# Patient Record
Sex: MALE | Race: WHITE | Employment: OTHER | URBAN - METROPOLITAN AREA
[De-identification: names, ages, dates, MRNs, and addresses within clinical notes are randomized per-mention and may not be internally consistent; named-entity substitution may affect disease eponyms.]

---

## 2018-11-11 ENCOUNTER — APPOINTMENT (OUTPATIENT)
Dept: CT IMAGING | Facility: HOSPITAL | Age: 73
DRG: 176 | End: 2018-11-11
Attending: EMERGENCY MEDICINE
Payer: MEDICAID

## 2018-11-11 ENCOUNTER — APPOINTMENT (OUTPATIENT)
Dept: ULTRASOUND IMAGING | Facility: HOSPITAL | Age: 73
DRG: 176 | End: 2018-11-11
Attending: HOSPITALIST
Payer: MEDICAID

## 2018-11-11 ENCOUNTER — APPOINTMENT (OUTPATIENT)
Dept: GENERAL RADIOLOGY | Facility: HOSPITAL | Age: 73
DRG: 176 | End: 2018-11-11
Attending: EMERGENCY MEDICINE
Payer: MEDICAID

## 2018-11-11 ENCOUNTER — HOSPITAL ENCOUNTER (INPATIENT)
Facility: HOSPITAL | Age: 73
LOS: 1 days | Discharge: HOME | DRG: 176 | End: 2018-11-12
Attending: EMERGENCY MEDICINE | Admitting: HOSPITALIST
Payer: MEDICAID

## 2018-11-11 DIAGNOSIS — I26.99 OTHER ACUTE PULMONARY EMBOLISM WITHOUT ACUTE COR PULMONALE (HCC): Primary | ICD-10-CM

## 2018-11-11 PROCEDURE — 93970 EXTREMITY STUDY: CPT | Performed by: HOSPITALIST

## 2018-11-11 PROCEDURE — 71046 X-RAY EXAM CHEST 2 VIEWS: CPT | Performed by: EMERGENCY MEDICINE

## 2018-11-11 PROCEDURE — 83036 HEMOGLOBIN GLYCOSYLATED A1C: CPT | Performed by: HOSPITALIST

## 2018-11-11 PROCEDURE — 85730 THROMBOPLASTIN TIME PARTIAL: CPT | Performed by: EMERGENCY MEDICINE

## 2018-11-11 PROCEDURE — 71260 CT THORAX DX C+: CPT | Performed by: EMERGENCY MEDICINE

## 2018-11-11 PROCEDURE — 96374 THER/PROPH/DIAG INJ IV PUSH: CPT

## 2018-11-11 PROCEDURE — 85025 COMPLETE CBC W/AUTO DIFF WBC: CPT | Performed by: EMERGENCY MEDICINE

## 2018-11-11 PROCEDURE — 80048 BASIC METABOLIC PNL TOTAL CA: CPT

## 2018-11-11 PROCEDURE — 81001 URINALYSIS AUTO W/SCOPE: CPT | Performed by: EMERGENCY MEDICINE

## 2018-11-11 PROCEDURE — 85379 FIBRIN DEGRADATION QUANT: CPT | Performed by: EMERGENCY MEDICINE

## 2018-11-11 PROCEDURE — 85025 COMPLETE CBC W/AUTO DIFF WBC: CPT

## 2018-11-11 PROCEDURE — 85730 THROMBOPLASTIN TIME PARTIAL: CPT | Performed by: HOSPITALIST

## 2018-11-11 PROCEDURE — 99285 EMERGENCY DEPT VISIT HI MDM: CPT

## 2018-11-11 RX ORDER — POLYETHYLENE GLYCOL 3350 17 G/17G
17 POWDER, FOR SOLUTION ORAL DAILY PRN
Status: DISCONTINUED | OUTPATIENT
Start: 2018-11-11 | End: 2018-11-12

## 2018-11-11 RX ORDER — LATANOPROST 50 UG/ML
1 SOLUTION/ DROPS OPHTHALMIC NIGHTLY
Status: DISCONTINUED | OUTPATIENT
Start: 2018-11-11 | End: 2018-11-12

## 2018-11-11 RX ORDER — DORZOLAMIDE HYDROCHLORIDE AND TIMOLOL MALEATE 20; 5 MG/ML; MG/ML
1 SOLUTION/ DROPS OPHTHALMIC 2 TIMES DAILY
Status: DISCONTINUED | OUTPATIENT
Start: 2018-11-11 | End: 2018-11-12

## 2018-11-11 RX ORDER — DORZOLAMIDE HYDROCHLORIDE AND TIMOLOL MALEATE 20; 5 MG/ML; MG/ML
1 SOLUTION/ DROPS OPHTHALMIC 2 TIMES DAILY
Status: ON HOLD | COMMUNITY
End: 2018-11-19

## 2018-11-11 RX ORDER — METOCLOPRAMIDE HYDROCHLORIDE 5 MG/ML
10 INJECTION INTRAMUSCULAR; INTRAVENOUS EVERY 8 HOURS PRN
Status: DISCONTINUED | OUTPATIENT
Start: 2018-11-11 | End: 2018-11-12

## 2018-11-11 RX ORDER — HEPARIN SODIUM AND DEXTROSE 10000; 5 [USP'U]/100ML; G/100ML
INJECTION INTRAVENOUS CONTINUOUS
Status: DISCONTINUED | OUTPATIENT
Start: 2018-11-11 | End: 2018-11-12

## 2018-11-11 RX ORDER — LISINOPRIL AND HYDROCHLOROTHIAZIDE 12.5; 1 MG/1; MG/1
1 TABLET ORAL DAILY
COMMUNITY

## 2018-11-11 RX ORDER — HEPARIN SODIUM AND DEXTROSE 10000; 5 [USP'U]/100ML; G/100ML
18 INJECTION INTRAVENOUS ONCE
Status: COMPLETED | OUTPATIENT
Start: 2018-11-11 | End: 2018-11-11

## 2018-11-11 RX ORDER — BISACODYL 10 MG
10 SUPPOSITORY, RECTAL RECTAL
Status: DISCONTINUED | OUTPATIENT
Start: 2018-11-11 | End: 2018-11-12

## 2018-11-11 RX ORDER — LATANOPROST 50 UG/ML
1 SOLUTION/ DROPS OPHTHALMIC NIGHTLY
COMMUNITY

## 2018-11-11 RX ORDER — LISINOPRIL AND HYDROCHLOROTHIAZIDE 12.5; 1 MG/1; MG/1
1 TABLET ORAL DAILY
Status: DISCONTINUED | OUTPATIENT
Start: 2018-11-12 | End: 2018-11-11

## 2018-11-11 RX ORDER — ONDANSETRON 2 MG/ML
4 INJECTION INTRAMUSCULAR; INTRAVENOUS EVERY 6 HOURS PRN
Status: DISCONTINUED | OUTPATIENT
Start: 2018-11-11 | End: 2018-11-12

## 2018-11-11 RX ORDER — HEPARIN SODIUM 1000 [USP'U]/ML
80 INJECTION, SOLUTION INTRAVENOUS; SUBCUTANEOUS ONCE
Status: COMPLETED | OUTPATIENT
Start: 2018-11-11 | End: 2018-11-11

## 2018-11-11 RX ORDER — ACETAMINOPHEN 325 MG/1
650 TABLET ORAL EVERY 6 HOURS PRN
Status: DISCONTINUED | OUTPATIENT
Start: 2018-11-11 | End: 2018-11-12

## 2018-11-11 RX ORDER — LISINOPRIL 10 MG/1
10 TABLET ORAL DAILY
Status: ON HOLD | COMMUNITY
End: 2018-11-11

## 2018-11-11 RX ORDER — SODIUM CHLORIDE 0.9 % (FLUSH) 0.9 %
3 SYRINGE (ML) INJECTION AS NEEDED
Status: DISCONTINUED | OUTPATIENT
Start: 2018-11-11 | End: 2018-11-12

## 2018-11-11 NOTE — H&P
[facility] Hospitalist H&P     CC: Patient presents with:  Fever (infectious)  Abdomen/Flank Pain (GI/)     PCP: Jessa Pereira    Date of Admission: 11/11/2018  9:02 AM    ASSESSMENT / PLAN:     Mr. Sheela Lindsey is a 75 yo M with PMH of HTN who presented with chest Giancarlo Granado  History reviewed. No pertinent surgical history. ALL:  No Known Allergies     Home Medications:    Outpatient Medications Marked as [facility]  lisinopril 10 MG Oral Tab Take 10 mg by mouth daily.  D 11/11/2018  CONCLUSION:   Bibasilar airspace disease with suspected trace right effusion. Findings could relate to atelectasis, aspiration, or pneumonia. Radiographic followup to resolution advised in 6-8 weeks. Borderline cardiomegaly.   Dictated by (CST)

## 2018-11-11 NOTE — ED INITIAL ASSESSMENT (HPI)
Per patient \"right upper quad pain started on Thursday, sharp pain with fever of 37.7 that start this morning\" Denies diarrhea, vomiting/nausea. Last BM Today.

## 2018-11-11 NOTE — ED PROVIDER NOTES
Patient Seen in: [facility] AND [facility] Emergency Department    History   Patient presents with:  Fever (infectious)  Abdomen/Flank Pain (GI/)    Stated Complaint:     HPI    Patient is a 68-year-old male who presents to the emergency department with a main wheezes. He has rhonchi in the right lower field. He has no rales. Abdominal: Soft. Bowel sounds are normal. There is no hepatosplenomegaly or hepatomegaly. There is no tenderness. No hernia. Musculoskeletal: Normal range of motion.    Neurological: He [facility])  (primary encounter diagnosis)    Disposition:  Admit  11/11/2018  2:10 pm    Follow-up:  No follow-up provider specified.   We recommend that you schedule follow up care with a primary care provider within the next three months to obtain basic health

## 2018-11-12 ENCOUNTER — APPOINTMENT (OUTPATIENT)
Dept: CV DIAGNOSTICS | Facility: HOSPITAL | Age: 73
DRG: 176 | End: 2018-11-12
Attending: HOSPITALIST
Payer: MEDICAID

## 2018-11-12 VITALS
SYSTOLIC BLOOD PRESSURE: 148 MMHG | HEART RATE: 58 BPM | TEMPERATURE: 99 F | WEIGHT: 169.88 LBS | BODY MASS INDEX: 28.3 KG/M2 | RESPIRATION RATE: 18 BRPM | DIASTOLIC BLOOD PRESSURE: 88 MMHG | OXYGEN SATURATION: 94 % | HEIGHT: 65 IN

## 2018-11-12 PROCEDURE — 80048 BASIC METABOLIC PNL TOTAL CA: CPT | Performed by: HOSPITALIST

## 2018-11-12 PROCEDURE — 85610 PROTHROMBIN TIME: CPT | Performed by: HOSPITALIST

## 2018-11-12 PROCEDURE — 93306 TTE W/DOPPLER COMPLETE: CPT | Performed by: HOSPITALIST

## 2018-11-12 PROCEDURE — 85730 THROMBOPLASTIN TIME PARTIAL: CPT | Performed by: HOSPITALIST

## 2018-11-12 PROCEDURE — 85025 COMPLETE CBC W/AUTO DIFF WBC: CPT | Performed by: HOSPITALIST

## 2018-11-12 RX ORDER — POTASSIUM CHLORIDE 20 MEQ/1
40 TABLET, EXTENDED RELEASE ORAL EVERY 4 HOURS
Status: COMPLETED | OUTPATIENT
Start: 2018-11-12 | End: 2018-11-12

## 2018-11-12 NOTE — PAYOR COMM NOTE
--------------  ADMISSION REVIEW     Payor: [address] #:  [id number]  Authorization Number: [id number]    Admit date: 11/11/18  Admit time: [address]       Admitting Physician: Elisha Abbott MD  Attending Physician:  Cory Cerda (Room air)       Current:BP (!) 155/91   Pulse 66   Temp 99.3 °F (37.4 °C) (Oral)   Resp 20   Ht 165.1 cm (5' 5\")   Wt 75.9 kg   SpO2 94%   BMI 27.85 kg/m²          Physical Exam   Constitutional: He is oriented to person, place, and time.  He appears well ---------                               -----------         ------                     CBC W/ DIFFERENTIAL[[id number]]          Abnormal            Final result                 Please view results for these tests on the individual orders.    RAINBOW DRAW B started on heparin gtt  - may switch to NOAC tomorrow pending insurance coverage    Mediastinal lymphadenopathy  - noted on CT chest, up to 1.5 cm in side  - recommend repeat CT chest in 3-6 months    HTN  - BP elevated  - takes lisinopril-HCTZ 10-12.5 mg Systems  Comprehensive ROS reviewed and negative except for what's stated above.      OBJECTIVE:  BP (!) 155/91   Pulse 62   Temp 99.3 °F (37.4 °C) (Oral)   Resp 21   Ht 165.1 cm (5' 5\")   Wt 167 lb 5.3 oz (75.9 kg)   SpO2 96%   BMI 27.85 kg/m²      GEN: m embolism. Right lower lobe segmental and subsegmental pulmonary emboli. 2. Small right effusion with right greater than left basilar predominant airspace disease. Right basilar airspace disease is suspicious for atelectasis.  No hypoenhancing airspace dise

## 2018-11-12 NOTE — PLAN OF CARE
CARDIOVASCULAR - ADULT    • Maintains optimal cardiac output and hemodynamic stability Progressing    • Absence of cardiac arrhythmias or at baseline Progressing        HEMATOLOGIC - ADULT    • Free from bleeding injury Progressing        PAIN - ADULT    •

## 2018-11-12 NOTE — CM/SW NOTE
MD order received for Eliquis cost. Script obtained. Call out to [place] for price check. They will call back. Somis Feliciano, [facility], [facility] [phone] - Received call from [facility] Lillianaue Milliecnt requires prior auth.  Can give coupon for one month if patient is [facility]

## 2018-11-12 NOTE — CM/SW NOTE
11/12/18 1200   CM/SW Screening   Patient's Mental Status Alert;Oriented   Patient's Home Environment Condo/Apt with elevator   Number of Levels in Home 1   Patient lives with Spouse   Patient Status Prior to Admission   Independent with ADLs and Mobili

## 2018-11-12 NOTE — DISCHARGE SUMMARY
General Medicine Discharge Summary     Patient ID:  Landon Fulton  74year old  7/2/1945    Admit date: 11/11/2018    Discharge date and time: 11/12/18    Attending Physician: Beverly Knox MD     Primary Care Physician: Randy Licea     Reason for a edema    Operative Procedures:      Imaging: Xr Chest Pa + Lat Chest (cpt=71046)    Result Date: 11/11/2018  CONCLUSION:   Bibasilar airspace disease with suspected trace right effusion. Findings could relate to atelectasis, aspiration, or pneumonia.  Radio START taking these medications    apixaban 5 MG Tabs  Commonly known as:  ELIQUIS  Take 2 tabs (10mg) by mouth twice daily for 7 days, then take 1 tab (5mg) by mouth twice daily thereafter.         CONTINUE taking these medications    Dorzolamide HCl-T

## 2018-11-12 NOTE — PLAN OF CARE
CARDIOVASCULAR - ADULT    • Maintains optimal cardiac output and hemodynamic stability Completed    • Absence of cardiac arrhythmias or at baseline Completed        HEMATOLOGIC - ADULT    • Free from bleeding injury Completed        PAIN - ADULT    • Joi

## 2018-11-13 NOTE — PAYOR COMM NOTE
--------------  DISCHARGE REVIEW    Payor: [address] #:  [id number]  Authorization Number: [id number]    Admit date: 11/11/18  Admit time:  1525  Discharge Date: 11/12/2018  4:55 PM     Admitting Physician: Capo Parks MD  Att ordered  - started on heparin gtt  - may switch to NOAC tomorrow pending insurance coverage     Mediastinal lymphadenopathy  - noted on CT chest, up to 1.5 cm in side  - recommend repeat CT chest in 3-6 months     HTN  - BP elevated  - takes lisinopril-HCT granulomatous exposure. There are calcified and noncalcified mediastinal lymph nodes, some of which are enlarged by size criteria. These could be reactive or neoplastic. A followup chest CT is recommended in 3-6 months. 4. Small hiatal hernia.  Concentric minutes    Patient had opportunity to ask questions and state understand and agree with therapeutic plan as outlined      Glenna Womack MD  [facility] Hospitalist          Electronically signed by Shi Mosley MD on 11/12/2018  1:24 PM         REVIEWER

## 2018-11-16 ENCOUNTER — APPOINTMENT (OUTPATIENT)
Dept: CT IMAGING | Facility: HOSPITAL | Age: 73
DRG: 696 | End: 2018-11-16
Attending: EMERGENCY MEDICINE
Payer: MEDICAID

## 2018-11-16 ENCOUNTER — HOSPITAL ENCOUNTER (INPATIENT)
Facility: HOSPITAL | Age: 73
LOS: 1 days | Discharge: HOME HEALTH | DRG: 696 | End: 2018-11-19
Attending: EMERGENCY MEDICINE | Admitting: HOSPITALIST
Payer: MEDICAID

## 2018-11-16 DIAGNOSIS — R31.0 FRANK HEMATURIA: Primary | ICD-10-CM

## 2018-11-16 PROCEDURE — 85610 PROTHROMBIN TIME: CPT | Performed by: EMERGENCY MEDICINE

## 2018-11-16 PROCEDURE — 96360 HYDRATION IV INFUSION INIT: CPT

## 2018-11-16 PROCEDURE — 36415 COLL VENOUS BLD VENIPUNCTURE: CPT

## 2018-11-16 PROCEDURE — 51702 INSERT TEMP BLADDER CATH: CPT

## 2018-11-16 PROCEDURE — 74176 CT ABD & PELVIS W/O CONTRAST: CPT | Performed by: EMERGENCY MEDICINE

## 2018-11-16 PROCEDURE — 81001 URINALYSIS AUTO W/SCOPE: CPT | Performed by: EMERGENCY MEDICINE

## 2018-11-16 PROCEDURE — 86900 BLOOD TYPING SEROLOGIC ABO: CPT | Performed by: EMERGENCY MEDICINE

## 2018-11-16 PROCEDURE — 87086 URINE CULTURE/COLONY COUNT: CPT | Performed by: EMERGENCY MEDICINE

## 2018-11-16 PROCEDURE — 71260 CT THORAX DX C+: CPT | Performed by: EMERGENCY MEDICINE

## 2018-11-16 PROCEDURE — 86850 RBC ANTIBODY SCREEN: CPT | Performed by: EMERGENCY MEDICINE

## 2018-11-16 PROCEDURE — 85025 COMPLETE CBC W/AUTO DIFF WBC: CPT | Performed by: EMERGENCY MEDICINE

## 2018-11-16 PROCEDURE — 99285 EMERGENCY DEPT VISIT HI MDM: CPT

## 2018-11-16 PROCEDURE — 80048 BASIC METABOLIC PNL TOTAL CA: CPT | Performed by: EMERGENCY MEDICINE

## 2018-11-16 PROCEDURE — 86901 BLOOD TYPING SEROLOGIC RH(D): CPT | Performed by: EMERGENCY MEDICINE

## 2018-11-16 RX ORDER — SODIUM CHLORIDE 0.9 % (FLUSH) 0.9 %
3 SYRINGE (ML) INJECTION AS NEEDED
Status: DISCONTINUED | OUTPATIENT
Start: 2018-11-16 | End: 2018-11-19

## 2018-11-16 RX ORDER — LISINOPRIL 10 MG/1
10 TABLET ORAL DAILY
Status: DISCONTINUED | OUTPATIENT
Start: 2018-11-17 | End: 2018-11-19

## 2018-11-16 RX ORDER — DORZOLAMIDE HYDROCHLORIDE AND TIMOLOL MALEATE 20; 5 MG/ML; MG/ML
1 SOLUTION/ DROPS OPHTHALMIC 2 TIMES DAILY
Status: DISCONTINUED | OUTPATIENT
Start: 2018-11-16 | End: 2018-11-18

## 2018-11-16 RX ORDER — LIDOCAINE HYDROCHLORIDE 20 MG/ML
10 JELLY TOPICAL ONCE
Status: COMPLETED | OUTPATIENT
Start: 2018-11-16 | End: 2018-11-16

## 2018-11-16 RX ORDER — LISINOPRIL AND HYDROCHLOROTHIAZIDE 12.5; 1 MG/1; MG/1
1 TABLET ORAL DAILY
Status: DISCONTINUED | OUTPATIENT
Start: 2018-11-17 | End: 2018-11-16

## 2018-11-16 RX ORDER — HYDROCHLOROTHIAZIDE 12.5 MG/1
12.5 CAPSULE, GELATIN COATED ORAL DAILY
Status: DISCONTINUED | OUTPATIENT
Start: 2018-11-17 | End: 2018-11-16

## 2018-11-16 RX ORDER — SODIUM CHLORIDE 9 MG/ML
INJECTION, SOLUTION INTRAVENOUS
Status: COMPLETED
Start: 2018-11-16 | End: 2018-11-16

## 2018-11-16 RX ORDER — LATANOPROST 50 UG/ML
1 SOLUTION/ DROPS OPHTHALMIC NIGHTLY
Status: DISCONTINUED | OUTPATIENT
Start: 2018-11-16 | End: 2018-11-18

## 2018-11-16 RX ORDER — ONDANSETRON 2 MG/ML
4 INJECTION INTRAMUSCULAR; INTRAVENOUS EVERY 6 HOURS PRN
Status: DISCONTINUED | OUTPATIENT
Start: 2018-11-16 | End: 2018-11-19

## 2018-11-16 RX ORDER — TRAMADOL HYDROCHLORIDE 50 MG/1
50 TABLET ORAL EVERY 6 HOURS PRN
Status: DISCONTINUED | OUTPATIENT
Start: 2018-11-16 | End: 2018-11-19

## 2018-11-16 RX ORDER — HYDRALAZINE HYDROCHLORIDE 25 MG/1
25 TABLET, FILM COATED ORAL EVERY 8 HOURS PRN
Status: DISCONTINUED | OUTPATIENT
Start: 2018-11-16 | End: 2018-11-18

## 2018-11-16 RX ORDER — LISINOPRIL 20 MG/1
20 TABLET ORAL DAILY
Status: DISCONTINUED | OUTPATIENT
Start: 2018-11-17 | End: 2018-11-16

## 2018-11-16 RX ORDER — HYDROCHLOROTHIAZIDE 12.5 MG/1
12.5 CAPSULE, GELATIN COATED ORAL DAILY
Status: DISCONTINUED | OUTPATIENT
Start: 2018-11-16 | End: 2018-11-19

## 2018-11-16 RX ORDER — MAGNESIUM HYDROXIDE 1200 MG/15ML
3000 LIQUID ORAL CONTINUOUS
Status: DISCONTINUED | OUTPATIENT
Start: 2018-11-16 | End: 2018-11-19

## 2018-11-16 RX ORDER — SODIUM CHLORIDE 9 MG/ML
INJECTION, SOLUTION INTRAVENOUS CONTINUOUS
Status: DISCONTINUED | OUTPATIENT
Start: 2018-11-16 | End: 2018-11-17

## 2018-11-16 RX ORDER — LISINOPRIL 20 MG/1
20 TABLET ORAL DAILY
Status: DISCONTINUED | OUTPATIENT
Start: 2018-11-16 | End: 2018-11-16

## 2018-11-16 RX ORDER — ACETAMINOPHEN 325 MG/1
650 TABLET ORAL EVERY 6 HOURS PRN
Status: DISCONTINUED | OUTPATIENT
Start: 2018-11-16 | End: 2018-11-19

## 2018-11-16 NOTE — H&P
[facility] Hospitalist H&P       CC: Patient presents with:  Urinary Symptoms (urologic)       PCP: Geena Silva    History of Present Illness: Mr. Cornelia Colon is a 73 yo M with PMH of HTN, glaucoma, and recent unprovoked PE, DC'ed on 11/12 who presents with hematu distress   Head:  Normocephalic, without obvious abnormality, atraumatic. Eyes:  Sclera anicteric, No conjunctival pallor, EOMs intact. Nose: Nares normal. Septum midline. Mucosa normal. No drainage.    Throat: Lips, mucosa, and tongue normal. Teeth an without biliary ductal dilatation. 4.  Small hiatal hernia. 5.  Coronary atherosclerosis. Cardiomegaly. 6.  4 mm pulmonary nodule within the right lower lobe, similar to prior CT from 11/11/18.   This finding appears calcified when correlated with prior CT but decrease to 5mg BID  (nearly 7 days with 10mg BID and CT with improving clot)     Mediastinal lymphadenopathy  - noted on CT chest, up to 1.5 cm in size  - recommend repeat CT chest in 3months     HTN  - BP elevated  - takes lisinopril-HCTZ 10-12.5 mg

## 2018-11-16 NOTE — ED PROVIDER NOTES
Patient Seen in: [facility] AND [facility] Emergency Department    History   Patient presents with:  Urinary Symptoms (urologic)    Stated Complaint: Hematuria    HPI    Patient is a 68-year-old [place] male who arrives in the emergency department with his gra BASIC METABOLIC PANEL (8) - Abnormal; Notable for the following components:       Result Value    Glucose 107 (*)     BUN 26 (*)     GFR, Non- 53 (*)     All other components within normal limits   PROTHROMBIN TIME (PT) - Abnormal; Notabl Oral)(cpt=74176)    Result Date: 11/16/2018  CONCLUSION:  1.   Small right pleural effusion and small right basal pulmonary opacities, which could represent pulmonary infarcts with reactive effusion given the presence of pulmonary embolus demonstrated on 11 urine color changing to pink. D/w daughter that pt will be placed in observational stay for clearing of urine. She agrees to plan. D/w dr Oniel Vega, requests urology consult. D/w Dr Terra Denise, will see later today or tomorrow in consultation.    Admission disposit

## 2018-11-16 NOTE — ED NOTES
Informed Dr. Mo Lopez regarding patient's blood pressure. MD is aware and SHAYY Weems will put in orders for blood pressure medicine.

## 2018-11-17 PROCEDURE — 80053 COMPREHEN METABOLIC PANEL: CPT | Performed by: HOSPITALIST

## 2018-11-17 PROCEDURE — 84132 ASSAY OF SERUM POTASSIUM: CPT | Performed by: HOSPITALIST

## 2018-11-17 PROCEDURE — 83735 ASSAY OF MAGNESIUM: CPT | Performed by: HOSPITALIST

## 2018-11-17 PROCEDURE — 85025 COMPLETE CBC W/AUTO DIFF WBC: CPT | Performed by: HOSPITALIST

## 2018-11-17 RX ORDER — MAGNESIUM OXIDE 400 MG (241.3 MG MAGNESIUM) TABLET
400 TABLET ONCE
Status: COMPLETED | OUTPATIENT
Start: 2018-11-17 | End: 2018-11-17

## 2018-11-17 NOTE — PLAN OF CARE
Problem: Patient Centered Care  Goal: Patient preferences are identified and integrated in the patient's plan of care  Interventions:  - What would you like us to know as we care for you?   - Provide timely, complete, and accurate information to patient/fa appropriate  - Fluid restriction as ordered  - Instruct patient on fluid and nutrition restrictions as appropriate  Outcome: Progressing      Problem: GENITOURINARY - ADULT  Goal: Absence of urinary retention  INTERVENTIONS:  - Assess patient’s ability to

## 2018-11-17 NOTE — PROGRESS NOTES
[facility] Hospitalist Progress Note     CC: Hospital Follow up    PCP: Reinaldo Reddy       Assessment/Plan:     Principal Problem:    Chyrel Current hematuria    Mr. Howie Joyner is a 73 yo M with PMH of HTN, glaucoma, and recent unprovoked PE, DC'ed on 11/12 who presents with Fely filed at 11/17/2018 1100  Gross per 24 hour   Intake 3804 ml   Output 2375 ml   Net 1429 ml       Last 3 Weights  11/17/18 0500 : 170 lb (77.1 kg)  11/16/18 1501 : 167 lb 14.4 oz (76.2 kg)  11/16/18 1013 : 169 lb 15.6 oz (77.1 kg)  11/12/18 is also a 21 mm diameter cyst within the right kidney. 3.  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 4.  Small hiatal hernia. 5.  Coronary atherosclerosis. Cardiomegaly.  6.  4 mm pulmonary nodule within the right lower lobe, s

## 2018-11-18 ENCOUNTER — APPOINTMENT (OUTPATIENT)
Dept: CT IMAGING | Facility: HOSPITAL | Age: 73
DRG: 696 | End: 2018-11-18
Attending: HOSPITALIST
Payer: MEDICAID

## 2018-11-18 PROCEDURE — 71260 CT THORAX DX C+: CPT | Performed by: HOSPITALIST

## 2018-11-18 PROCEDURE — 80048 BASIC METABOLIC PNL TOTAL CA: CPT | Performed by: HOSPITALIST

## 2018-11-18 PROCEDURE — 85025 COMPLETE CBC W/AUTO DIFF WBC: CPT | Performed by: HOSPITALIST

## 2018-11-18 PROCEDURE — 96375 TX/PRO/DX INJ NEW DRUG ADDON: CPT

## 2018-11-18 PROCEDURE — 93005 ELECTROCARDIOGRAM TRACING: CPT

## 2018-11-18 PROCEDURE — 93010 ELECTROCARDIOGRAM REPORT: CPT | Performed by: HOSPITALIST

## 2018-11-18 PROCEDURE — A4216 STERILE WATER/SALINE, 10 ML: HCPCS | Performed by: HOSPITALIST

## 2018-11-18 PROCEDURE — 83735 ASSAY OF MAGNESIUM: CPT | Performed by: HOSPITALIST

## 2018-11-18 PROCEDURE — 84484 ASSAY OF TROPONIN QUANT: CPT | Performed by: HOSPITALIST

## 2018-11-18 RX ORDER — SODIUM CHLORIDE 9 MG/ML
INJECTION, SOLUTION INTRAVENOUS CONTINUOUS
Status: DISPENSED | OUTPATIENT
Start: 2018-11-18 | End: 2018-11-18

## 2018-11-18 RX ORDER — DORZOLAMIDE HYDROCHLORIDE AND TIMOLOL MALEATE 20; 5 MG/ML; MG/ML
1 SOLUTION/ DROPS OPHTHALMIC
Status: DISCONTINUED | OUTPATIENT
Start: 2018-11-18 | End: 2018-11-19

## 2018-11-18 RX ORDER — POTASSIUM CHLORIDE 14.9 MG/ML
20 INJECTION INTRAVENOUS ONCE
Status: DISCONTINUED | OUTPATIENT
Start: 2018-11-18 | End: 2018-11-18

## 2018-11-18 RX ORDER — POTASSIUM CHLORIDE 20 MEQ/1
40 TABLET, EXTENDED RELEASE ORAL ONCE
Status: COMPLETED | OUTPATIENT
Start: 2018-11-18 | End: 2018-11-18

## 2018-11-18 RX ORDER — SODIUM CHLORIDE 9 MG/ML
INJECTION, SOLUTION INTRAVENOUS CONTINUOUS
Status: DISCONTINUED | OUTPATIENT
Start: 2018-11-18 | End: 2018-11-18

## 2018-11-18 RX ORDER — MAGNESIUM SULFATE HEPTAHYDRATE 40 MG/ML
2 INJECTION, SOLUTION INTRAVENOUS ONCE
Status: DISCONTINUED | OUTPATIENT
Start: 2018-11-18 | End: 2018-11-18

## 2018-11-18 RX ORDER — LATANOPROST 50 UG/ML
1 SOLUTION/ DROPS OPHTHALMIC NIGHTLY
Status: DISCONTINUED | OUTPATIENT
Start: 2018-11-18 | End: 2018-11-19

## 2018-11-18 RX ORDER — DORZOLAMIDE HYDROCHLORIDE AND TIMOLOL MALEATE 20; 5 MG/ML; MG/ML
1 SOLUTION/ DROPS OPHTHALMIC NIGHTLY
Status: DISCONTINUED | OUTPATIENT
Start: 2018-11-18 | End: 2018-11-18

## 2018-11-18 RX ORDER — MAGNESIUM OXIDE 400 MG (241.3 MG MAGNESIUM) TABLET
400 TABLET ONCE
Status: COMPLETED | OUTPATIENT
Start: 2018-11-18 | End: 2018-11-18

## 2018-11-18 RX ORDER — SODIUM CHLORIDE 9 MG/ML
INJECTION, SOLUTION INTRAVENOUS
Status: COMPLETED
Start: 2018-11-18 | End: 2018-11-18

## 2018-11-18 NOTE — PROGRESS NOTES
[facility] Hospitalist Progress Note     CC: Hospital Follow up    PCP: Niraj Lindsay       Assessment/Plan:     Principal Problem:    Bryant Romie hematuria    Mr. Gus Joyner is a 75 yo M with PMH of HTN, glaucoma, and recent unprovoked PE, DC'ed on 11/12 who presented wit were discussed with patient and/or family by bedside. Thank Chhaya Bean MD    [facility] Hospitalist     Subjective:     Seen earlier this morning, denies CP, SOB, or N/V, feeling better, no abd pain, no more bleeding.     9:30 code blue called, s/p rocio MCH  30.8  30.3  30.9   MCHC  33.8  33.4  34.1   RDW  13.5  13.6  13.0   WBC  9.3  11.4*  11.5*   PLT  217  229  264         Recent Labs   Lab  11/16/18   1045  11/17/18   0700  11/18/18   0529   GLU  107*  95  104*   BUN  26*  26*  28*   CREATSERUM  1.3 atelectasis. 3.  Scattered nonspecific ground-glass changes within both lower lobes likely representing localized interstitial edema, hemorrhage, or ill-defined infarcts. 4.  Mild cardiomegaly. 5.  Small hiatal hernia.  6.  Mediastinal/hilar lymphadenopathy

## 2018-11-18 NOTE — PROGRESS NOTES
[facility]  Progress Note    Margarita Moralez Patient Status:  Observation    1945 MRN [id number]   Location [facility]5W Attending Terrence Briceño MD   Hosp Day # 0 PCP Jose Damon     Subjective:  Margarita Moralez is a(n) 74year old male. active all four quadrants,     no masses, no organomegaly   Urine:      Clear per chan   Extremities:   Extremities normal, atraumatic, no cyanosis or edema   Skin:   Skin color, texture, turgor normal, no rashes or lesions             Lab Results   Fanshawe

## 2018-11-18 NOTE — CONSULTS
[facility]    PATIENT'S NAME: Chelo Bobbi   ATTENDING PHYSICIAN: Robert Morse MD   CONSULTING PHYSICIAN: Lanny Sumner MD   PATIENT ACCOUNT#:   [id number]    LOCATION:  [facility] RECORD #:   [id number]       DATE OF BIRTH:  [birth year] history of diabetes. No new allergies. No new rashes. There is no limiting arthritis. He is a very active gentleman. PHYSICAL EXAMINATION:    GENERAL:  He is a male, currently appears comfortable.   VITAL SIGNS:  Temperature 98.7 degrees Fahrenheit systolic function. 5.   Outpatient stress test.  Family states that his last stress test was several years ago. 6.   Discussed with family at bedside. Note that the patient speaks [place] and family helped to translate. Dictated By [address] [place] [address].  Atrica

## 2018-11-19 VITALS
DIASTOLIC BLOOD PRESSURE: 70 MMHG | HEART RATE: 56 BPM | BODY MASS INDEX: 26.51 KG/M2 | SYSTOLIC BLOOD PRESSURE: 135 MMHG | OXYGEN SATURATION: 96 % | HEIGHT: 65 IN | RESPIRATION RATE: 18 BRPM | WEIGHT: 159.13 LBS | TEMPERATURE: 98 F

## 2018-11-19 PROCEDURE — 85025 COMPLETE CBC W/AUTO DIFF WBC: CPT | Performed by: HOSPITALIST

## 2018-11-19 PROCEDURE — 80048 BASIC METABOLIC PNL TOTAL CA: CPT | Performed by: HOSPITALIST

## 2018-11-19 PROCEDURE — 83735 ASSAY OF MAGNESIUM: CPT | Performed by: HOSPITALIST

## 2018-11-19 PROCEDURE — 84132 ASSAY OF SERUM POTASSIUM: CPT | Performed by: HOSPITALIST

## 2018-11-19 RX ORDER — PHENAZOPYRIDINE HYDROCHLORIDE 100 MG/1
100 TABLET, FILM COATED ORAL 2 TIMES DAILY PRN
Qty: 4 TABLET | Refills: 0 | Status: SHIPPED | OUTPATIENT
Start: 2018-11-19

## 2018-11-19 RX ORDER — DORZOLAMIDE HYDROCHLORIDE AND TIMOLOL MALEATE 20; 5 MG/ML; MG/ML
1 SOLUTION/ DROPS OPHTHALMIC 3 TIMES DAILY
Qty: 1 BOTTLE | Refills: 0 | Status: SHIPPED | OUTPATIENT
Start: 2018-11-19

## 2018-11-19 RX ORDER — PHENAZOPYRIDINE HYDROCHLORIDE 100 MG/1
100 TABLET, FILM COATED ORAL 2 TIMES DAILY PRN
Status: DISCONTINUED | OUTPATIENT
Start: 2018-11-19 | End: 2018-11-19

## 2018-11-19 RX ORDER — PHENAZOPYRIDINE HYDROCHLORIDE 100 MG/1
100 TABLET, FILM COATED ORAL 3 TIMES DAILY PRN
Status: DISCONTINUED | OUTPATIENT
Start: 2018-11-19 | End: 2018-11-19

## 2018-11-19 NOTE — PLAN OF CARE
Problem: Patient/Family Goals  Goal: Patient/Family Long Term Goal  Patient's Long Term Goal: prevent uti/sepsis    Interventions:  - catheter care q shift  -increase fluid intake  -monitor vital signs and labs  - See additional Care Plan goals for specifi restriction as ordered  - Instruct patient on fluid and nutrition restrictions as appropriate  Outcome: Progressing      Problem: GENITOURINARY - ADULT  Goal: Absence of urinary retention  INTERVENTIONS:  - Assess patient’s ability to void and empty bladde

## 2018-11-19 NOTE — CM/SW NOTE
11/19/18 1300   Discharge disposition   Expected discharge disposition Home or Self   Name of 161Quincy Mcgrath 576 ([facility])  ( referrals sent to [facility], [facility] and [facility])   Home services after discharge Senior services   HME provider [facility]

## 2018-11-19 NOTE — PROGRESS NOTES
[facility] Cardiology  Progress Note    Mahsa Leaver Patient Status:  Observation    1945 MRN [id number]   Location [facility]5W Attending Andree Mcintosh MD   Hosp Day # 0 PCP HAL CHANDRA     Impression:  IMPRESSION:    1.        Sync Alert  Psych: cooperative       Current Facility-Administered Medications:  latanoprost (XALATAN) 0.005 % ophthalmic solution 1 drop 1 drop Both Eyes Nightly   Dorzolamide HCl-Timolol Mal (Dorzolamide-Timolol) 22.3-6.8 MG/ML ophthalmic solution 1 drop 1 dr chest CT in 3-6 months is recommended to ensure stability/resolution. Above-described bibasilar airspace disease can also be reassessed at that time. 5. Sequelae of remote granulomatous disease both above and below the diaphragm. 6. Small hiatal hernia.

## 2018-11-19 NOTE — PLAN OF CARE
Problem: Patient Centered Care  Goal: Patient preferences are identified and integrated in the patient's plan of care  Interventions:  - What would you like us to know as we care for you?   - Provide timely, complete, and accurate information to patient/fa FOR INFECTION - ADULT  Goal: Absence of fever/infection during anticipated neutropenic period  INTERVENTIONS  - Monitor WBC  - Administer growth factors as ordered  - Implement neutropenic guidelines  Outcome: Completed Date Met: 11/19/18      Problem: MET

## 2018-11-19 NOTE — DISCHARGE SUMMARY
[facility] Internal Medicine Discharge Summary   Patient ID:  Rai Garsia  [id number]  73 year old  7/2/1945    Admit date: 11/16/2018    Discharge date and time: 11/19/18    Attending Physician: Julio César Hills MD     Primary Care Physician: Scarlet Pabon (three) times daily.   What changed:  when to take this        CONTINUE taking these medications    latanoprost 0.005 % Soln  Commonly known as:  XALATAN     Lisinopril-Hydrochlorothiazide 10-12.5 MG Tabs           Where to Get Your Medications      You can Within few sec he was awake alert, moving all 4 ext, denies chest pain or sob, AO*3  - 's HR improved to 70's, O2 sats 99%,   - ECG without acute ischemic changes, trop negative, hgb 15  - tele with sinus amber vs. pause  - poss orthostatic vs vagal No visible mass or adenopathy. LUNGS: Bibasilar airspace disease. PLEURA: Possible trace right effusion. No pneumothorax. BONES: Multilevel degenerative changes are present.       CONCLUSION:   Bibasilar airspace disease with suspected trace right effusio intravenous contrast material.  Automated exposure control for dose reduction was used. Adjustment of the mA and/or kV was done based on the patient's size. Use of iterative reconstruction technique for dose reduction was used.    FINDINGS:  KIDNEYS:   21 m The visualized heart is mildly enlarged and there are coronary calcifications. 4 mm solid nodule in the right lower lobe (Series 3, image 1). 4 mm partially calcified nodule along the right major fissure. OTHER: Negative. CONCLUSION:  1.   Small rig tree. No suspicious filling defects are identified in the main, lobar, segmental, or proximal subsegmental pulmonary artery branches to suggest acute pulmonary embolism. The distal subsegmental branches are less well assessed.  Borderline dilation of the ma right greater than left lower lobe dependent airspace disease as compared with both 11/16/2018 and 11/11/2018. Small right and trace left pleural effusions.  Findings are suspicious for bibasilar pneumonia with aspiration in the differential. 3. Mild cardio bronchial wall thickening. Dependent subsegmental atelectasis in the lung bases. Round 24 x 40 mm opacity in the lateral basal segment of the right lower lobe.   Smaller 8 x 26 mm opacity in  the outer aspect anterior basal segment of the right lower lobe contrast material.  Automated exposure control for dose reduction was used. Adjustment of the mA and/or kV was done based on the patient's size. Use of iterative reconstruction technique for dose reduction was used.    CT CHEST FINDINGS: There are low-densi thickening of the distal esophagus may be on the basis of esophagitis. Correlate with symptoms and consider upper endoscopy if indicated. 5. Mild cardiomegaly with coronary artery calcifications.   Dictated by (CST): Justin Lindsey MD on 11/11/2018 at 1

## 2018-11-19 NOTE — FACE TO FACE NOTE
[facility]    Face to Face Encounter Note    Devika Service Patient Status:  Inpatient    1945 MRN [id number]   Location [facility]5W Attending Erick Aguayo MD   Hosp Day # 1 PCP [facility], or a non-physician p establishment of the plan of care. This physician will be followed by a physician who will periodically review the plan of care.   The findings from this face-to-face encounter have been communicated with the patient's community-based physician who will be

## 2018-11-19 NOTE — PLAN OF CARE
Problem: Patient Centered Care  Goal: Patient preferences are identified and integrated in the patient's plan of care  Interventions:  - What would you like us to know as we care for you?   - Provide timely, complete, and accurate information to patient/fa anticipated neutropenic period  INTERVENTIONS  - Monitor WBC  - Administer growth factors as ordered  - Implement neutropenic guidelines  Outcome: Progressing      Problem: METABOLIC/FLUID AND ELECTROLYTES - ADULT  Goal: Electrolytes maintained within norm

## 2018-11-19 NOTE — PAYOR COMM NOTE
--------------PATIENT WAS ADMITTED OBSERVATION ON 11/16, INPATIENT ORDER ON 11/18    ADMISSION REVIEW     Payor: [address] [place] [address] #:  [id number]  Authorization Number: [id number]    Admit date: 11/18/18  Admit time: 1013       Admitting Ph Temp src Oral   SpO2 97 %   O2 Device None (Room air)       Current:BP (!) 157/92   Pulse 55   Temp 98.4 °F (36.9 °C) (Oral)   Resp 16   Ht 165.1 cm (5' 5\")   Wt 77.1 kg   SpO2 97%   BMI 28.29 kg/m²          Physical Exam    GENERAL: No acute distress, aw Please view results for these tests on the individual orders. TYPE AND SCREEN    Narrative: The following orders were created for panel order TYPE AND SCREEN.   Procedure                               Abnormality         Status                     --- CONCLUSION:  1. No pulmonary embolus to the subsegmental pulmonary artery level. Previously demonstrated pulmonary emboli on prior CT from 11/11/18 are no longer seen.  2.  Interval resolution of right pleural effusion since prior exam.  There are patchy Author:  Sancho Neri MD Service:  Lucero Daley Author Type:  Physician    Filed:  11/16/2018  6:18 PM Date of Service:  11/16/2018  3:24 PM Status:  Addendum    :  Sancho Neri MD (Physician)    Related Notes:  Original Note by Sancho Neri MD (Physician) filed at Comprehensive ROS reviewed and negative except for what's stated above.      OBJECTIVE:  BP (!) 186/91 (BP Location: Right arm)   Pulse 56   Temp 98.8 °F (37.1 °C) (Oral)   Resp 18   Ht 5' 5\" (1.651 m)   Wt 167 lb 14.4 oz (76.2 kg)   SpO2 97%   BMI 27.94 k CONCLUSION:  1.   Small right pleural effusion and small right basal pulmonary opacities, which could represent pulmonary infarcts with reactive effusion given the presence of pulmonary embolus demonstrated on 11/11/18 CT chest. 2.  2 mm nonobstructing ston - Urology consulted  - UA with signs of infection  - will need to continue [facility] [place], operated by [facility] given recent PE  - NPO at midnight in case hematuria persistent then may need cysto  - CT abd pelvis with poss non obs ureteral stones     Pulmonary embolus  - no known risk fact Dorzolamide HCl-Timolol Mal (Dorzolamide-Timolol) 22.3-6.8 MG/ML ophthalmic solution 1 drop     Date Action Dose Route User    11/19/2018 0804 Given 1 drop Both Eyes Sánchez Guerrero RN      hydrochlorothiazide (MICROZIDE) cap 12.5 mg     Date Action Dose - takes lisinopril-HCTZ 10-12.5 mg daily, continue  - prn hydral  - Per family patient taking PRN, educated need to take every day, NOT as needed     Hyperglycemia  - no hx of DM2  -  HgA1c 5.5     FN:  - IVF:none  - Diet: reg     DVT Prophy: eliquis  Atro HCT  42.3  43.4  42.6   MCV  90.6  91.1  90.5   MCH  30.3  30.8  30.3   MCHC  33.4  33.8  33.4   RDW  13.5  13.5  13.6   WBC  9.1  9.3  11.4*   PLT  163  217  229                  Recent Labs   Lab  11/12/18   0508  11/16/18   1045  11/17/18   0700   GLU - poss orthostatic vs vagal episode  - bolus 500cc, start on rate of NS   - repeat CT chest PE protocol to rule out Saddle PE  - continue tele, will re- examine      Hematuria  - started on CBI in ED, urine color improved  - Urology consulted, clamped tube Blood pressure 146/73, pulse 72, temperature 98.7 °F (37.1 °C), temperature source Oral, resp.  rate 18, height 5' 5\" (1.651 m), weight 170 lb (77.1 kg), SpO2 95 %.     Temp:  [98.3 °F (36.8 °C)-99.2 °F (37.3 °C)] 98.7 °F (37.1 °C)  Pulse:  [59-72] 72  Res Imaging:  Ct Abdomen+pelvis Kidneystone 2d Rndr(no Iv,no Oral)(cpt=74176)     Result Date: 11/16/2018  CONCLUSION:  1.   Small right pleural effusion and small right basal pulmonary opacities, which could represent pulmonary infarcts with reactive effusion • magnesium oxide  400 mg Oral Once   • sodium chloride         • Dorzolamide HCl-Timolol Mal  1 drop Both Eyes BID   • latanoprost  1 drop Both Eyes Nightly   • hydrochlorothiazide  12.5 mg Oral Daily   • lisinopril  10 mg Oral Daily   • apixaban  5 mg Or Temp (24hrs), Av.2 °F (36.8 °C), Min:97.9 °F (36.6 °C), Max:98.7 °F (37.1 °C)        Intake/Output Summary (Last 24 hours) at 2018 0801  Last data filed at 2018 0549      Gross per 24 hour   Intake 168 ml   Output 1000 ml   Net -832 ml     1. No evidence of acute pulmonary embolism. Previously noted right lower lobe segmental/subsegmental branch pulmonary embolism is not apparent.   2. Progressive right greater than left lower lobe dependent airspace disease as compared with both 11/16/2018 a

## 2018-11-19 NOTE — PLAN OF CARE
Problem: Patient Centered Care  Goal: Patient preferences are identified and integrated in the patient's plan of care  Interventions:  - What would you like us to know as we care for you?   - Provide timely, complete, and accurate information to patient/fa ADULT  Goal: Absence of fever/infection during anticipated neutropenic period  INTERVENTIONS  - Monitor WBC  - Administer growth factors as ordered  - Implement neutropenic guidelines  Outcome: Adequate for Discharge      Problem: METABOLIC/FLUID AND ELECT

## 2018-11-19 NOTE — PROGRESS NOTES
[facility]    Progress Note    Jeancarlos Oliveros Patient Status:  Inpatient    1945 MRN [id number]   Location [facility]5W Attending Christofer Singer MD   Hosp Day # 1 PCP [facility]     Subjective:  Jeancarlos Oliveros is a(n) 74 [address]ummer.     Charly Hopkins PA-C  [address] [facility]  w: [phone]  c: [phone]

## 2018-11-21 NOTE — PAYOR COMM NOTE
--------------PLEASE FAX BACK APPROVED DAYS    DISCHARGE REVIEW    Payor: [address] [place] [address] #:  [id number]  Authorization Number: [id number]    Admit date: 11/16/18  Admit time:  3600  Discharge Date: 11/19/2018  1:48 PM     Admitting Physi TO HAVE THIS ARRANGED.       Follow up with Dr Akhil Henderson in 1-2 weeks to get outpt stress test.      Discharge meds     Medication List      START taking these medications    Phenazopyridine HCl 100 MG Tabs  Commonly known as:  PYRIDIUM  Take 1 tablet (100 planning for outpt cysto next week. They d/w Pt and granddaughter. Course complicated by syncopal episode on 11/18/18. Pt hgb is relatively stable despite hematuria and no hypotension. Had recent echo. Repeat chest CT improved.   Seen by cardiology and lisinopril-HCTZ 10-12.5 mg daily, continue  - prn hydral  - Per family patient taking PRN, educated need to take every day, NOT as needed     Hyperglycemia  - no hx of DM2  -  HgA1c 5.5    Consults: IP CONSULT TO HOSPITALIST  IP CONSULT TO [facility] CONCLUSION:   Partially occlusive thrombus in one of the paired right posterior tibial veins. No evidence of deep venous thrombosis in the visualized veins of the left lower extremity.   Dictated by (CST): Hailey Darby MD on 11/11/2018 at 17:41 BOWEL/MESENTERY:  There is no small or large bowel obstruction. The terminal ileum and appendix (series 5, image 39) are within normal limits. There is no significant colonic diverticulosis.  There is no free air, free fluid, or lymphadenopathy in the abdom COMPARISON: [facility], CT CHEST PAIN/PE (IV ONLY) EM, 11/11/2018, 10:42. [facility], CT CHEST PAIN/PE (IV ONLY) EM, 11/16/2018, 12:06.   INDICATIONS: Syncope, hypotensive, suddenly hypoxic, with history of PE.  TECHNIQUE: ISSAC axis subcarinal lymph node as well as a 1.6 x 1.5 cm right hilar kimberlyn conglomerate. These appear unchanged since prior. CHEST WALL: No axillary mass or lymphadenopathy.   LIMITED ABDOMEN: Small hiatal hernia with distal esophageal wall thickening and patul (IV ONLY) EM, 11/11/2018, 10:42. INDICATIONS: Hematuria, coughing up blood, recent PE diagnosis  TECHNIQUE: CT images of the chest were obtained with non-ionic intravenous contrast material.  Automated exposure control for dose reduction was used.  Adjustm nonspecific ground-glass changes within both lower lobes likely representing localized interstitial edema, hemorrhage, or ill-defined infarcts. 4.  Mild cardiomegaly. 5.  Small hiatal hernia.  6.  Mediastinal/hilar lymphadenopathy similar to prior exam.   D LIMITED ABDOMEN: There is a small hiatal hernia. There is wall thickening of the distal esophagus. CONCLUSION:   1. Positive for pulmonary embolism. Right lower lobe segmental and subsegmental pulmonary emboli.   2. Small right effusion with right grea

## 2019-04-18 ENCOUNTER — OFFICE VISIT (OUTPATIENT)
Dept: GASTROENTEROLOGY | Facility: CLINIC | Age: 74
End: 2019-04-18
Payer: MEDICAID

## 2019-04-18 ENCOUNTER — TELEPHONE (OUTPATIENT)
Dept: GASTROENTEROLOGY | Facility: CLINIC | Age: 74
End: 2019-04-18

## 2019-04-18 VITALS
HEIGHT: 65.5 IN | BODY MASS INDEX: 27.55 KG/M2 | HEART RATE: 50 BPM | WEIGHT: 167.38 LBS | SYSTOLIC BLOOD PRESSURE: 123 MMHG | DIASTOLIC BLOOD PRESSURE: 75 MMHG

## 2019-04-18 DIAGNOSIS — R93.5 ABNORMAL CT OF THE ABDOMEN: Primary | ICD-10-CM

## 2019-04-18 PROCEDURE — 99243 OFF/OP CNSLTJ NEW/EST LOW 30: CPT | Performed by: INTERNAL MEDICINE

## 2019-04-18 NOTE — TELEPHONE ENCOUNTER
,    Pt would like prep sent to [facility] [address].  I will change pharmacy in his chart

## 2019-04-18 NOTE — PATIENT INSTRUCTIONS
Colonoscopy and EGD  - colon screening  - thickened stomach on scan (abn CT)  - colyte prep  - MAC sedation

## 2019-04-19 ENCOUNTER — TELEPHONE (OUTPATIENT)
Dept: GASTROENTEROLOGY | Facility: CLINIC | Age: 74
End: 2019-04-19

## 2019-04-19 DIAGNOSIS — Z12.11 SCREEN FOR COLON CANCER: ICD-10-CM

## 2019-04-19 DIAGNOSIS — R93.89 ABNORMAL CT SCAN: Primary | ICD-10-CM

## 2019-04-19 NOTE — TELEPHONE ENCOUNTER
Scheduled for:  Colonoscopy [phone] & EGD [id number]  Provider Name: Chirag Rainey  Date:  7/15/19  Location:  [facility]  Sedation:  MAC  Time: 945am ot told to arrive at 845am   Prep:Colyte  Meds/Allergies Reconciled?:  Physician reviewed  Diagnosis with codes: Screen for col

## 2019-04-29 NOTE — PROGRESS NOTES
Pj Bey is a 74year old male. HPI:   Patient presents with:  Gastro-esophageal Reflux: esophageal thickening through ct scan results    The patient has thickening noted on CT scan of the stomach.         HISTORY:  Past Medical History:   Pepito Daily lesions  Neuro- appropriate response, alert, no confusion  .   ASSESSMENT/PLAN:   Assessment     Colonoscopy and EGD  - colon screening  - thickened stomach on scan (abn CT)  - colyte prep  - MAC sedation         Orders This Visit:  No orders of the defined

## 2019-05-16 ENCOUNTER — HOSPITAL ENCOUNTER (OUTPATIENT)
Dept: CT IMAGING | Facility: HOSPITAL | Age: 74
Discharge: HOME | End: 2019-05-16
Attending: FAMILY MEDICINE
Payer: MEDICAID

## 2019-05-16 DIAGNOSIS — R91.8 OTHER NONSPECIFIC ABNORMAL FINDING OF LUNG FIELD: ICD-10-CM

## 2019-05-16 PROCEDURE — 71260 CT THORAX DX C+: CPT | Performed by: FAMILY MEDICINE

## 2019-05-16 PROCEDURE — 82565 ASSAY OF CREATININE: CPT

## 2019-07-02 ENCOUNTER — HOSPITAL ENCOUNTER (EMERGENCY)
Facility: HOSPITAL | Age: 74
Discharge: HOME | End: 2019-07-02
Attending: EMERGENCY MEDICINE
Payer: MEDICAID

## 2019-07-02 ENCOUNTER — APPOINTMENT (OUTPATIENT)
Dept: ULTRASOUND IMAGING | Facility: HOSPITAL | Age: 74
End: 2019-07-02
Attending: EMERGENCY MEDICINE
Payer: MEDICAID

## 2019-07-02 VITALS
RESPIRATION RATE: 18 BRPM | DIASTOLIC BLOOD PRESSURE: 69 MMHG | SYSTOLIC BLOOD PRESSURE: 120 MMHG | HEART RATE: 51 BPM | BODY MASS INDEX: 27 KG/M2 | TEMPERATURE: 98 F | OXYGEN SATURATION: 97 % | WEIGHT: 167.31 LBS

## 2019-07-02 DIAGNOSIS — M79.662 PAIN OF LEFT CALF: Primary | ICD-10-CM

## 2019-07-02 PROCEDURE — 93971 EXTREMITY STUDY: CPT | Performed by: EMERGENCY MEDICINE

## 2019-07-02 PROCEDURE — 99284 EMERGENCY DEPT VISIT MOD MDM: CPT

## 2019-07-02 NOTE — ED PROVIDER NOTES
Patient Seen in: [facility] AND [facility] Emergency Department    History   Patient presents with:  Deep Vein Thrombosis (cardiovascular)    Stated Complaint: left calf pain    HPI    Patient is a 78-year-old male with a history of  PE last year .   He is no polly normal. Exhibits no distension and no mass. There is no tenderness. There is no rebound and no guarding. Musculoskeletal: Normal range of motion. Focused exam on his left lower extremity there is no redness warmth or swelling. There is no skin changes.

## 2019-07-02 NOTE — ED INITIAL ASSESSMENT (HPI)
Patient here with c/o atraumatic left calf pain x 1 week. Hx of blood clot and PE. Not currently on anticoagulants. Denies CP/SOB.

## 2019-07-11 ENCOUNTER — TELEPHONE (OUTPATIENT)
Dept: GASTROENTEROLOGY | Facility: CLINIC | Age: 74
End: 2019-07-11

## 2019-07-11 NOTE — TELEPHONE ENCOUNTER
I spoke to daughter--we do not have auth to speak to anyone but pt. She states pt in [place] until Sat evening and cell does not always work there--she cannot find instructions to help pt with prep, pt speaks Yur[place].  Would like instructions faxed to [facility]

## 2019-07-12 NOTE — TELEPHONE ENCOUNTER
[facility] states pharmacy did not receive fax with prep kit instructions.  Please refax

## 2019-07-12 NOTE — TELEPHONE ENCOUNTER
Prep instructions faxed to [address] [place] [address] at [phone] with return confirmation. Daughter notified.

## 2019-07-15 ENCOUNTER — HOSPITAL ENCOUNTER (OUTPATIENT)
Facility: HOSPITAL | Age: 74
Setting detail: HOSPITAL OUTPATIENT SURGERY
Discharge: HOME | End: 2019-07-15
Attending: INTERNAL MEDICINE | Admitting: INTERNAL MEDICINE
Payer: MEDICAID

## 2019-07-15 ENCOUNTER — ANESTHESIA (OUTPATIENT)
Dept: ENDOSCOPY | Facility: HOSPITAL | Age: 74
End: 2019-07-15
Payer: MEDICAID

## 2019-07-15 ENCOUNTER — ANESTHESIA EVENT (OUTPATIENT)
Dept: ENDOSCOPY | Facility: HOSPITAL | Age: 74
End: 2019-07-15
Payer: MEDICAID

## 2019-07-15 ENCOUNTER — TELEPHONE (OUTPATIENT)
Dept: GASTROENTEROLOGY | Facility: CLINIC | Age: 74
End: 2019-07-15

## 2019-07-15 DIAGNOSIS — R93.89 ABNORMAL CT SCAN: ICD-10-CM

## 2019-07-15 DIAGNOSIS — Z12.11 SCREEN FOR COLON CANCER: ICD-10-CM

## 2019-07-15 PROCEDURE — 0DBK8ZX EXCISION OF ASCENDING COLON, VIA NATURAL OR ARTIFICIAL OPENING ENDOSCOPIC, DIAGNOSTIC: ICD-10-PCS | Performed by: INTERNAL MEDICINE

## 2019-07-15 PROCEDURE — 43235 EGD DIAGNOSTIC BRUSH WASH: CPT | Performed by: INTERNAL MEDICINE

## 2019-07-15 PROCEDURE — 0DBP8ZX EXCISION OF RECTUM, VIA NATURAL OR ARTIFICIAL OPENING ENDOSCOPIC, DIAGNOSTIC: ICD-10-PCS | Performed by: INTERNAL MEDICINE

## 2019-07-15 PROCEDURE — 0DBN8ZX EXCISION OF SIGMOID COLON, VIA NATURAL OR ARTIFICIAL OPENING ENDOSCOPIC, DIAGNOSTIC: ICD-10-PCS | Performed by: INTERNAL MEDICINE

## 2019-07-15 PROCEDURE — 45380 COLONOSCOPY AND BIOPSY: CPT | Performed by: INTERNAL MEDICINE

## 2019-07-15 PROCEDURE — 45385 COLONOSCOPY W/LESION REMOVAL: CPT | Performed by: INTERNAL MEDICINE

## 2019-07-15 PROCEDURE — 0DJ08ZZ INSPECTION OF UPPER INTESTINAL TRACT, VIA NATURAL OR ARTIFICIAL OPENING ENDOSCOPIC: ICD-10-PCS | Performed by: INTERNAL MEDICINE

## 2019-07-15 RX ORDER — SODIUM CHLORIDE, SODIUM LACTATE, POTASSIUM CHLORIDE, CALCIUM CHLORIDE 600; 310; 30; 20 MG/100ML; MG/100ML; MG/100ML; MG/100ML
INJECTION, SOLUTION INTRAVENOUS CONTINUOUS
Status: DISCONTINUED | OUTPATIENT
Start: 2019-07-15 | End: 2019-07-15

## 2019-07-15 RX ORDER — SODIUM CHLORIDE, SODIUM LACTATE, POTASSIUM CHLORIDE, CALCIUM CHLORIDE 600; 310; 30; 20 MG/100ML; MG/100ML; MG/100ML; MG/100ML
INJECTION, SOLUTION INTRAVENOUS CONTINUOUS PRN
Status: DISCONTINUED | OUTPATIENT
Start: 2019-07-15 | End: 2019-07-15 | Stop reason: SURG

## 2019-07-15 RX ORDER — LIDOCAINE HYDROCHLORIDE 10 MG/ML
INJECTION, SOLUTION EPIDURAL; INFILTRATION; INTRACAUDAL; PERINEURAL AS NEEDED
Status: DISCONTINUED | OUTPATIENT
Start: 2019-07-15 | End: 2019-07-15 | Stop reason: SURG

## 2019-07-15 RX ADMIN — LIDOCAINE HYDROCHLORIDE 50 MG: 10 INJECTION, SOLUTION EPIDURAL; INFILTRATION; INTRACAUDAL; PERINEURAL at 10:38:00

## 2019-07-15 RX ADMIN — SODIUM CHLORIDE, SODIUM LACTATE, POTASSIUM CHLORIDE, CALCIUM CHLORIDE: 600; 310; 30; 20 INJECTION, SOLUTION INTRAVENOUS at 10:38:00

## 2019-07-15 NOTE — H&P
History & Physical Examination    Patient Name: Todd Botello  MRN: [id number]  [facility]: [id number]  YOB: 1945    Diagnosis: abnormal CT scan  Colon cancer screening      Medications Prior to Admission:  aspirin 81 MG Oral Tab Take 81 mg by mouth

## 2019-07-15 NOTE — OPERATIVE REPORT
[facility] Endoscopy Report      Preoperative Diagnosis:  - abnormal CT scan  - colon cancer screening      Postoperative Diagnosis:   - colon polyps x 4  - diverticulosis  - internal hemorrhoids  - hiatal hernia 4 cm      Procedure:    Col found to be free of bleeding and specimens were retrieved and sent for analysis. Diverticular disease located in the sigmoid and descending colon.     Internal hemorrhoids noted    The esophagus was normal.  The GE junction and diaphragmatic impression w

## 2019-07-15 NOTE — ANESTHESIA PREPROCEDURE EVALUATION
Anesthesia PreOp Note    HPI:     Blase Dancer is a 66year old male who presents for preoperative consultation requested by: Erick Conley MD    Date of Surgery: 7/15/2019    Procedure(s):  COLONOSCOPY  ESOPHAGOGASTRODUODENOSCOPY (EGD)  Indication: History    Socioeconomic History      Marital status:       Spouse name: Not on file      Number of children: Not on file      Years of education: Not on file      Highest education level: Not on file    Occupational History      Not on file    Soci full  Dental - normal exam     Pulmonary - negative ROS and normal exam   Cardiovascular - normal exam  (+) hypertension,     Neuro/Psych - negative ROS     GI/Hepatic/Renal - negative ROS     Endo/Other - negative ROS   Abdominal  - normal exam

## 2019-07-16 VITALS
TEMPERATURE: 98 F | BODY MASS INDEX: 26.52 KG/M2 | HEIGHT: 66 IN | DIASTOLIC BLOOD PRESSURE: 71 MMHG | SYSTOLIC BLOOD PRESSURE: 125 MMHG | WEIGHT: 165 LBS | RESPIRATION RATE: 18 BRPM | HEART RATE: 45 BPM | OXYGEN SATURATION: 96 %

## 2019-07-17 ENCOUNTER — TELEPHONE (OUTPATIENT)
Dept: GASTROENTEROLOGY | Facility: CLINIC | Age: 74
End: 2019-07-17

## 2019-07-18 ENCOUNTER — TELEPHONE (OUTPATIENT)
Dept: GASTROENTEROLOGY | Facility: CLINIC | Age: 74
End: 2019-07-18

## 2019-07-18 NOTE — TELEPHONE ENCOUNTER
----- Message from India Mcgarry MD sent at 7/16/2019  5:10 PM CDT -----  RN to place on the colon call back for 5 years and mail letter to the pt. Thanks.

## 2020-09-10 ENCOUNTER — APPOINTMENT (OUTPATIENT)
Dept: CT IMAGING | Facility: HOSPITAL | Age: 75
End: 2020-09-10
Attending: EMERGENCY MEDICINE
Payer: MEDICAID

## 2020-09-10 ENCOUNTER — HOSPITAL ENCOUNTER (EMERGENCY)
Facility: HOSPITAL | Age: 75
Discharge: HOME | End: 2020-09-10
Attending: EMERGENCY MEDICINE
Payer: MEDICAID

## 2020-09-10 VITALS
TEMPERATURE: 98 F | RESPIRATION RATE: 16 BRPM | HEART RATE: 70 BPM | SYSTOLIC BLOOD PRESSURE: 150 MMHG | WEIGHT: 164.88 LBS | OXYGEN SATURATION: 96 % | DIASTOLIC BLOOD PRESSURE: 79 MMHG | HEIGHT: 65 IN | BODY MASS INDEX: 27.47 KG/M2

## 2020-09-10 DIAGNOSIS — K29.00 ACUTE GASTRITIS WITHOUT HEMORRHAGE, UNSPECIFIED GASTRITIS TYPE: Primary | ICD-10-CM

## 2020-09-10 LAB
ALBUMIN SERPL-MCNC: 3.9 G/DL (ref 3.4–5)
ALP LIVER SERPL-CCNC: 78 U/L (ref 45–117)
ALT SERPL-CCNC: 61 U/L (ref 16–61)
ANION GAP SERPL CALC-SCNC: 3 MMOL/L (ref 0–18)
AST SERPL-CCNC: 40 U/L (ref 15–37)
BASOPHILS # BLD AUTO: 0.03 X10(3) UL (ref 0–0.2)
BASOPHILS NFR BLD AUTO: 0.3 %
BILIRUB DIRECT SERPL-MCNC: 0.2 MG/DL (ref 0–0.2)
BILIRUB SERPL-MCNC: 0.3 MG/DL (ref 0.1–2)
BILIRUB UR QL: NEGATIVE
BUN BLD-MCNC: 26 MG/DL (ref 7–18)
BUN/CREAT SERPL: 19.1 (ref 10–20)
CALCIUM BLD-MCNC: 9.5 MG/DL (ref 8.5–10.1)
CHLORIDE SERPL-SCNC: 107 MMOL/L (ref 98–112)
CO2 SERPL-SCNC: 28 MMOL/L (ref 21–32)
COLOR UR: YELLOW
CREAT BLD-MCNC: 1.36 MG/DL (ref 0.7–1.3)
DEPRECATED RDW RBC AUTO: 43.9 FL (ref 35.1–46.3)
EOSINOPHIL # BLD AUTO: 0.11 X10(3) UL (ref 0–0.7)
EOSINOPHIL NFR BLD AUTO: 0.9 %
ERYTHROCYTE [DISTWIDTH] IN BLOOD BY AUTOMATED COUNT: 13.2 % (ref 11–15)
GLUCOSE BLD-MCNC: 120 MG/DL (ref 70–99)
GLUCOSE UR-MCNC: NEGATIVE MG/DL
HCT VFR BLD AUTO: 42.3 % (ref 39–53)
HGB BLD-MCNC: 14.4 G/DL (ref 13–17.5)
HGB UR QL STRIP.AUTO: NEGATIVE
IMM GRANULOCYTES # BLD AUTO: 0.05 X10(3) UL (ref 0–1)
IMM GRANULOCYTES NFR BLD: 0.4 %
LEUKOCYTE ESTERASE UR QL STRIP.AUTO: NEGATIVE
LIPASE SERPL-CCNC: 210 U/L (ref 73–393)
LYMPHOCYTES # BLD AUTO: 2.4 X10(3) UL (ref 1–4)
LYMPHOCYTES NFR BLD AUTO: 20.5 %
M PROTEIN MFR SERPL ELPH: 8.6 G/DL (ref 6.4–8.2)
MCH RBC QN AUTO: 30.6 PG (ref 26–34)
MCHC RBC AUTO-ENTMCNC: 34 G/DL (ref 31–37)
MCV RBC AUTO: 89.8 FL (ref 80–100)
MONOCYTES # BLD AUTO: 0.85 X10(3) UL (ref 0.1–1)
MONOCYTES NFR BLD AUTO: 7.3 %
NEUTROPHILS # BLD AUTO: 8.26 X10 (3) UL (ref 1.5–7.7)
NEUTROPHILS # BLD AUTO: 8.26 X10(3) UL (ref 1.5–7.7)
NEUTROPHILS NFR BLD AUTO: 70.6 %
NITRITE UR QL STRIP.AUTO: NEGATIVE
OSMOLALITY SERPL CALC.SUM OF ELEC: 292 MOSM/KG (ref 275–295)
PH UR: 7 [PH] (ref 5–8)
PLATELET # BLD AUTO: 162 10(3)UL (ref 150–450)
POTASSIUM SERPL-SCNC: 4.6 MMOL/L (ref 3.5–5.1)
PROT UR-MCNC: NEGATIVE MG/DL
RBC # BLD AUTO: 4.71 X10(6)UL (ref 3.8–5.8)
RBC #/AREA URNS AUTO: 2 /HPF
SODIUM SERPL-SCNC: 138 MMOL/L (ref 136–145)
SP GR UR STRIP: 1.02 (ref 1–1.03)
TROPONIN I SERPL-MCNC: <0.045 NG/ML (ref ?–0.04)
UROBILINOGEN UR STRIP-ACNC: <2
WBC # BLD AUTO: 11.7 X10(3) UL (ref 4–11)
WBC #/AREA URNS AUTO: 3 /HPF

## 2020-09-10 PROCEDURE — 80076 HEPATIC FUNCTION PANEL: CPT | Performed by: EMERGENCY MEDICINE

## 2020-09-10 PROCEDURE — 96361 HYDRATE IV INFUSION ADD-ON: CPT

## 2020-09-10 PROCEDURE — 99284 EMERGENCY DEPT VISIT MOD MDM: CPT

## 2020-09-10 PROCEDURE — 93005 ELECTROCARDIOGRAM TRACING: CPT

## 2020-09-10 PROCEDURE — 74177 CT ABD & PELVIS W/CONTRAST: CPT | Performed by: EMERGENCY MEDICINE

## 2020-09-10 PROCEDURE — 81001 URINALYSIS AUTO W/SCOPE: CPT | Performed by: EMERGENCY MEDICINE

## 2020-09-10 PROCEDURE — 96374 THER/PROPH/DIAG INJ IV PUSH: CPT

## 2020-09-10 PROCEDURE — 84484 ASSAY OF TROPONIN QUANT: CPT | Performed by: EMERGENCY MEDICINE

## 2020-09-10 PROCEDURE — 85025 COMPLETE CBC W/AUTO DIFF WBC: CPT | Performed by: EMERGENCY MEDICINE

## 2020-09-10 PROCEDURE — 80048 BASIC METABOLIC PNL TOTAL CA: CPT | Performed by: EMERGENCY MEDICINE

## 2020-09-10 PROCEDURE — 93010 ELECTROCARDIOGRAM REPORT: CPT | Performed by: EMERGENCY MEDICINE

## 2020-09-10 PROCEDURE — 83690 ASSAY OF LIPASE: CPT | Performed by: EMERGENCY MEDICINE

## 2020-09-10 RX ORDER — MORPHINE SULFATE 4 MG/ML
2 INJECTION, SOLUTION INTRAMUSCULAR; INTRAVENOUS ONCE
Status: DISCONTINUED | OUTPATIENT
Start: 2020-09-10 | End: 2020-09-11

## 2020-09-10 RX ORDER — ONDANSETRON 2 MG/ML
4 INJECTION INTRAMUSCULAR; INTRAVENOUS ONCE
Status: COMPLETED | OUTPATIENT
Start: 2020-09-10 | End: 2020-09-10

## 2020-09-10 RX ORDER — ONDANSETRON 4 MG/1
4 TABLET, ORALLY DISINTEGRATING ORAL EVERY 4 HOURS PRN
Qty: 10 TABLET | Refills: 0 | Status: SHIPPED | OUTPATIENT
Start: 2020-09-10 | End: 2020-09-17

## 2020-09-10 RX ORDER — OMEPRAZOLE 40 MG/1
40 CAPSULE, DELAYED RELEASE ORAL DAILY
Qty: 15 CAPSULE | Refills: 0 | Status: SHIPPED | OUTPATIENT
Start: 2020-09-10 | End: 2020-09-24

## 2020-09-11 NOTE — ED NOTES
Patient discharged home in no acute distress in care of family member. A&Ox4, skin p/w/d, denies cp/sob. Ambulating with steady gait and verbalized understanding of d/c instructions and prescriptions.

## 2020-09-11 NOTE — ED INITIAL ASSESSMENT (HPI)
Pt states after he ate watermelon and after he threw up and is having mid epigastric pain x 2 hours.  +n/v

## 2020-09-11 NOTE — ED PROVIDER NOTES
[facility] Emergency Department    History   Patient presents with:  Nausea/vomiting    Stated Complaint: n/v    HPI    Patient complains of mid upper  abdominal pain that began today. Pain described as sharp.   Pain rated as 9/10 otherwise stated in HPI.     Physical Exam     ED Triage Vitals [09/10/20 2022]   BP (!) 186/76   Pulse (!) 128   Resp 18   Temp 97.6 °F (36.4 °C)   Temp src Temporal   SpO2 97 %   O2 Device None (Room air)       Current:/79   Pulse 70   Temp 97.6 °F (*)     All other components within normal limits   LIPASE - Normal   TROPONIN I - Normal   CBC WITH DIFFERENTIAL WITH PLATELET    Narrative: The following orders were created for panel order CBC WITH DIFFERENTIAL WITH PLATELET.   Procedure

## 2020-09-15 ENCOUNTER — HOSPITAL ENCOUNTER (EMERGENCY)
Facility: HOSPITAL | Age: 75
Discharge: HOME | End: 2020-09-15
Attending: EMERGENCY MEDICINE
Payer: MEDICAID

## 2020-09-15 VITALS
DIASTOLIC BLOOD PRESSURE: 87 MMHG | SYSTOLIC BLOOD PRESSURE: 144 MMHG | HEART RATE: 61 BPM | OXYGEN SATURATION: 95 % | TEMPERATURE: 99 F | RESPIRATION RATE: 17 BRPM

## 2020-09-15 DIAGNOSIS — K29.00 ACUTE GASTRITIS WITHOUT HEMORRHAGE, UNSPECIFIED GASTRITIS TYPE: Primary | ICD-10-CM

## 2020-09-15 LAB
ALBUMIN SERPL-MCNC: 3.3 G/DL (ref 3.4–5)
ALP LIVER SERPL-CCNC: 75 U/L (ref 45–117)
ALT SERPL-CCNC: 42 U/L (ref 16–61)
ANION GAP SERPL CALC-SCNC: 6 MMOL/L (ref 0–18)
AST SERPL-CCNC: 30 U/L (ref 15–37)
BASOPHILS # BLD AUTO: 0.03 X10(3) UL (ref 0–0.2)
BASOPHILS NFR BLD AUTO: 0.2 %
BILIRUB DIRECT SERPL-MCNC: 0.2 MG/DL (ref 0–0.2)
BILIRUB SERPL-MCNC: 0.6 MG/DL (ref 0.1–2)
BUN BLD-MCNC: 25 MG/DL (ref 7–18)
BUN/CREAT SERPL: 18.9 (ref 10–20)
CALCIUM BLD-MCNC: 9.4 MG/DL (ref 8.5–10.1)
CHLORIDE SERPL-SCNC: 105 MMOL/L (ref 98–112)
CO2 SERPL-SCNC: 28 MMOL/L (ref 21–32)
CREAT BLD-MCNC: 1.32 MG/DL (ref 0.7–1.3)
DEPRECATED RDW RBC AUTO: 43.3 FL (ref 35.1–46.3)
EOSINOPHIL # BLD AUTO: 0.26 X10(3) UL (ref 0–0.7)
EOSINOPHIL NFR BLD AUTO: 1.9 %
ERYTHROCYTE [DISTWIDTH] IN BLOOD BY AUTOMATED COUNT: 13.2 % (ref 11–15)
GLUCOSE BLD-MCNC: 90 MG/DL (ref 70–99)
HCT VFR BLD AUTO: 40.4 % (ref 39–53)
HGB BLD-MCNC: 13.7 G/DL (ref 13–17.5)
IMM GRANULOCYTES # BLD AUTO: 0.05 X10(3) UL (ref 0–1)
IMM GRANULOCYTES NFR BLD: 0.4 %
LIPASE SERPL-CCNC: 249 U/L (ref 73–393)
LYMPHOCYTES # BLD AUTO: 2.73 X10(3) UL (ref 1–4)
LYMPHOCYTES NFR BLD AUTO: 20.5 %
M PROTEIN MFR SERPL ELPH: 8.3 G/DL (ref 6.4–8.2)
MCH RBC QN AUTO: 30.3 PG (ref 26–34)
MCHC RBC AUTO-ENTMCNC: 33.9 G/DL (ref 31–37)
MCV RBC AUTO: 89.4 FL (ref 80–100)
MONOCYTES # BLD AUTO: 1.93 X10(3) UL (ref 0.1–1)
MONOCYTES NFR BLD AUTO: 14.5 %
NEUTROPHILS # BLD AUTO: 8.34 X10 (3) UL (ref 1.5–7.7)
NEUTROPHILS # BLD AUTO: 8.34 X10(3) UL (ref 1.5–7.7)
NEUTROPHILS NFR BLD AUTO: 62.5 %
OSMOLALITY SERPL CALC.SUM OF ELEC: 292 MOSM/KG (ref 275–295)
PLATELET # BLD AUTO: 185 10(3)UL (ref 150–450)
POTASSIUM SERPL-SCNC: 3.4 MMOL/L (ref 3.5–5.1)
RBC # BLD AUTO: 4.52 X10(6)UL (ref 3.8–5.8)
SODIUM SERPL-SCNC: 139 MMOL/L (ref 136–145)
WBC # BLD AUTO: 13.3 X10(3) UL (ref 4–11)

## 2020-09-15 PROCEDURE — 83690 ASSAY OF LIPASE: CPT | Performed by: EMERGENCY MEDICINE

## 2020-09-15 PROCEDURE — 80048 BASIC METABOLIC PNL TOTAL CA: CPT | Performed by: EMERGENCY MEDICINE

## 2020-09-15 PROCEDURE — C9113 INJ PANTOPRAZOLE SODIUM, VIA: HCPCS | Performed by: EMERGENCY MEDICINE

## 2020-09-15 PROCEDURE — 85025 COMPLETE CBC W/AUTO DIFF WBC: CPT | Performed by: EMERGENCY MEDICINE

## 2020-09-15 PROCEDURE — 99284 EMERGENCY DEPT VISIT MOD MDM: CPT

## 2020-09-15 PROCEDURE — 96374 THER/PROPH/DIAG INJ IV PUSH: CPT

## 2020-09-15 PROCEDURE — 80076 HEPATIC FUNCTION PANEL: CPT | Performed by: EMERGENCY MEDICINE

## 2020-09-15 RX ORDER — FAMOTIDINE 20 MG/1
20 TABLET ORAL 2 TIMES DAILY
Qty: 28 TABLET | Refills: 0 | Status: SHIPPED | OUTPATIENT
Start: 2020-09-15 | End: 2020-09-24

## 2020-09-15 RX ORDER — SUCRALFATE 1 G/1
1 TABLET ORAL
Qty: 56 TABLET | Refills: 0 | Status: SHIPPED | OUTPATIENT
Start: 2020-09-15 | End: 2020-09-24

## 2020-09-15 RX ORDER — POTASSIUM CHLORIDE 20 MEQ/1
40 TABLET, EXTENDED RELEASE ORAL ONCE
Status: COMPLETED | OUTPATIENT
Start: 2020-09-15 | End: 2020-09-15

## 2020-09-15 RX ORDER — ONDANSETRON 4 MG/1
4 TABLET, ORALLY DISINTEGRATING ORAL EVERY 8 HOURS PRN
Qty: 15 TABLET | Refills: 0 | Status: SHIPPED | OUTPATIENT
Start: 2020-09-15 | End: 2020-09-20

## 2020-09-15 RX ORDER — PANTOPRAZOLE SODIUM 40 MG/1
40 TABLET, DELAYED RELEASE ORAL DAILY
Qty: 30 TABLET | Refills: 0 | Status: SHIPPED | OUTPATIENT
Start: 2020-09-15 | End: 2020-10-15

## 2020-09-15 NOTE — ED NOTES
Patient presents with:  Abdomen/Flank Pain    /87   Pulse 61   Temp 99.1 °F (37.3 °C) (Oral)   Resp 17   SpO2 95%     PATIENT AOX3 TO ED VIA PRIVATE VHEICLE PATIENT CO OF ABD PAIN WAS RECENTLY DX WITH GASTRITIS, REPORTED TAKING IBUPROFEN WITHOUT RELI

## 2020-09-15 NOTE — ED NOTES
PATIENT APPROVED FOR DISCHARGE PER ER PROVIDER. PATIENT GIVEN VERBAL AND WRITTEN DISCHARGE INSTRUCTIONS. GIVEN INSTRUCTIONS ON RX X 4, FOLLOW UP WITH GI. VERBALIZES UNDERSTANDING AND SIGNED DISCHARGE INSTRUCTIONS.      PATIENT AOX3 OUT OF ED WITH STEADY GAI

## 2020-09-15 NOTE — ED INITIAL ASSESSMENT (HPI)
Patient presents with abdominal pain. Patient was seen here a few days ago for the same.  Patient denies n/v/d.

## 2020-09-15 NOTE — ED PROVIDER NOTES
Patient Seen in: [address] [place] [address] Emergency Department    History   Patient presents with:  Abdomen/Flank Pain    Stated Complaint: abdominal pain     HPI    64-year-old male with past medical history of hypertension, pulmonary embolism not on anticoagula file.    Social History    Tobacco Use      Smoking status: Never Smoker      Smokeless tobacco: Never Used    Alcohol use: No    Drug use: No      Review of Systems :  Constitutional: As per HPI  Respiratory: Negative for cough and shortness of breath. Absolute 8.34 (*)     Monocyte Absolute 1.93 (*)     All other components within normal limits   LIPASE - Normal   CBC WITH DIFFERENTIAL WITH PLATELET    Narrative: The following orders were created for panel order CBC WITH DIFFERENTIAL WITH PLATELET. Hypodensity left kidney is too small to characterize. AORTA/VASCULAR:   Abdominal aorta is normal in caliber. ABDOMINAL NODES: No mass or adenopathy. BOWEL/MESENTERY:  No dilated loops of bowel or definite abnormal bowel wall thickening.   Colonic divertic personal hand/facial/oropharyngeal contact and gloves worn throughout encounter. See note and/or contact this provider for further PPE details.     We recommend that you schedule follow up care with a primary care provider within the next three months to ob

## 2020-09-17 ENCOUNTER — TELEPHONE (OUTPATIENT)
Dept: GASTROENTEROLOGY | Facility: CLINIC | Age: 75
End: 2020-09-17

## 2020-09-17 NOTE — TELEPHONE ENCOUNTER
I returned the call per ED note patient to follow up with Dr. Aubrie Hdz outpatient. Accepted appointment with Dr. Aubrie Hdz next Thursday and given detailed directions to our office.     Future Appointments   Date Time Provider Dez Carrillo   9/24/2020  1:15

## 2020-09-17 NOTE — TELEPHONE ENCOUNTER
Patient's granddaughter Linell Canton called in to advise that patient needs to be seen as soon as possible for follow up visit. Patient was recently discharged from the ER and may have a stomach ulcer. Dr. Migel Hylton schedule is currently booked out.  Please advise as

## 2020-09-24 ENCOUNTER — OFFICE VISIT (OUTPATIENT)
Dept: GASTROENTEROLOGY | Facility: CLINIC | Age: 75
End: 2020-09-24
Payer: MEDICAID

## 2020-09-24 ENCOUNTER — TELEPHONE (OUTPATIENT)
Dept: GASTROENTEROLOGY | Facility: CLINIC | Age: 75
End: 2020-09-24

## 2020-09-24 VITALS
HEART RATE: 48 BPM | TEMPERATURE: 98 F | SYSTOLIC BLOOD PRESSURE: 104 MMHG | WEIGHT: 161.38 LBS | HEIGHT: 65 IN | BODY MASS INDEX: 26.89 KG/M2 | DIASTOLIC BLOOD PRESSURE: 69 MMHG

## 2020-09-24 DIAGNOSIS — K86.2 PANCREATIC CYST: Primary | ICD-10-CM

## 2020-09-24 DIAGNOSIS — R10.9 ABDOMINAL PAIN, UNSPECIFIED ABDOMINAL LOCATION: Primary | ICD-10-CM

## 2020-09-24 PROCEDURE — 3008F BODY MASS INDEX DOCD: CPT | Performed by: INTERNAL MEDICINE

## 2020-09-24 PROCEDURE — 99213 OFFICE O/P EST LOW 20 MIN: CPT | Performed by: INTERNAL MEDICINE

## 2020-09-24 PROCEDURE — 3078F DIAST BP <80 MM HG: CPT | Performed by: INTERNAL MEDICINE

## 2020-09-24 PROCEDURE — 3074F SYST BP LT 130 MM HG: CPT | Performed by: INTERNAL MEDICINE

## 2020-09-24 RX ORDER — ATORVASTATIN CALCIUM 20 MG/1
20 TABLET, FILM COATED ORAL DAILY
COMMUNITY

## 2020-09-24 RX ORDER — AMLODIPINE BESYLATE 5 MG/1
5 TABLET ORAL DAILY
COMMUNITY
Start: 2020-09-06

## 2020-09-24 NOTE — PROGRESS NOTES
Hi Cassidy is a 66year old male.     HPI:   Patient presents with:  Er F/u: abdominal pain,weight loss ,nausea and vomiting    The patient is a 64-year-old male who has a history of hypertension, pulmonary embolism who is seen today for abdominal pain, eyes 3 (three) times daily. 1 Bottle 0   • Lisinopril-Hydrochlorothiazide 10-12.5 MG Oral Tab Take 1 tablet by mouth daily. • latanoprost 0.005 % Ophthalmic Solution Place 1 drop into both eyes nightly.        • famoTIDine 20 MG Oral Tab Take 1 tablet possibility of gastritis, ulcer disease. On recent CT scan pancreatic lesion was noted and plan MRI scan for follow-up evaluation. We also discussed upper endoscopy further work-up. Risks and benefits of procedure patient is agreeable to proceeding.

## 2020-09-24 NOTE — TELEPHONE ENCOUNTER
Scheduled for:   Karolina Steele  Provider Name:    Date:  12/16/2020  Location:  [facility]  Sedation:  Mac  Time:  200 Pm (pt is aware to arrive at 1130 Am)  Prep:  Npo 3 hours before prior to procedure  Meds/Allergies Reconciled?: Physician reviewed    Diag

## 2020-09-24 NOTE — PATIENT INSTRUCTIONS
Abdominal pain  - EGD with MAC  - continue on pantoprazole  - avoid ibuprofen ( Aleve)     Pancreatic cyst   - call to set up MRI pancreas

## 2020-10-06 ENCOUNTER — TELEPHONE (OUTPATIENT)
Dept: GASTROENTEROLOGY | Facility: CLINIC | Age: 75
End: 2020-10-06

## 2020-10-06 ENCOUNTER — HOSPITAL ENCOUNTER (OUTPATIENT)
Dept: MRI IMAGING | Facility: HOSPITAL | Age: 75
Discharge: HOME | End: 2020-10-06
Attending: INTERNAL MEDICINE
Payer: MEDICAID

## 2020-10-06 DIAGNOSIS — K86.2 PANCREATIC CYST: ICD-10-CM

## 2020-10-06 PROCEDURE — 76376 3D RENDER W/INTRP POSTPROCES: CPT | Performed by: INTERNAL MEDICINE

## 2020-10-06 PROCEDURE — 74183 MRI ABD W/O CNTR FLWD CNTR: CPT | Performed by: INTERNAL MEDICINE

## 2020-10-06 PROCEDURE — A9575 INJ GADOTERATE MEGLUMI 0.1ML: HCPCS | Performed by: INTERNAL MEDICINE

## 2020-10-06 NOTE — TELEPHONE ENCOUNTER
Message left on patient's personal voicemail. Gallstones are noted.     Pancreatic cysts x2, plan repeat MRI scan in 1 years time, RN to place on callback list.    Patient should have upper endoscopy scheduled, some thickening of the lower esophagus was no

## 2020-10-07 ENCOUNTER — TELEPHONE (OUTPATIENT)
Dept: GASTROENTEROLOGY | Facility: CLINIC | Age: 75
End: 2020-10-07

## 2020-10-07 NOTE — TELEPHONE ENCOUNTER
----- Message from Francisco Hinkle MD sent at 10/6/2020  5:58 PM CDT -----  Pancreatic cysts (x 2) - repeat MRI in 1 year to continue to monitor. Gallstones noted in gallbladder.      Esophagitis and hiatal hernia noted - will evaluate further on the

## 2020-10-07 NOTE — TELEPHONE ENCOUNTER
1 year recall entered into patient outreach in [address] [place] [address] Rd. Next MRI due 10/6/2021. Patient scheduled for EGD December 2020.     Future Appointments   Date Time Provider Dez Carrillo   12/16/2020 12:30 PM [facility], PROCEDURE [id number] None

## 2020-12-13 ENCOUNTER — LAB ENCOUNTER (OUTPATIENT)
Dept: LAB | Facility: HOSPITAL | Age: 75
End: 2020-12-13
Attending: INTERNAL MEDICINE
Payer: MEDICAID

## 2020-12-13 DIAGNOSIS — Z01.818 PRE-OP TESTING: ICD-10-CM

## 2020-12-16 ENCOUNTER — HOSPITAL ENCOUNTER (OUTPATIENT)
Facility: HOSPITAL | Age: 75
Setting detail: HOSPITAL OUTPATIENT SURGERY
Discharge: HOME | End: 2020-12-16
Attending: INTERNAL MEDICINE | Admitting: INTERNAL MEDICINE
Payer: MEDICAID

## 2020-12-16 ENCOUNTER — ANESTHESIA (OUTPATIENT)
Dept: ENDOSCOPY | Facility: HOSPITAL | Age: 75
End: 2020-12-16
Payer: MEDICAID

## 2020-12-16 ENCOUNTER — ANESTHESIA EVENT (OUTPATIENT)
Dept: ENDOSCOPY | Facility: HOSPITAL | Age: 75
End: 2020-12-16
Payer: MEDICAID

## 2020-12-16 VITALS
SYSTOLIC BLOOD PRESSURE: 131 MMHG | HEIGHT: 65 IN | BODY MASS INDEX: 26.66 KG/M2 | RESPIRATION RATE: 16 BRPM | DIASTOLIC BLOOD PRESSURE: 68 MMHG | TEMPERATURE: 97 F | HEART RATE: 47 BPM | OXYGEN SATURATION: 98 % | WEIGHT: 160 LBS

## 2020-12-16 DIAGNOSIS — Z01.818 PRE-OP TESTING: Primary | ICD-10-CM

## 2020-12-16 DIAGNOSIS — R10.9 ABDOMINAL PAIN, UNSPECIFIED ABDOMINAL LOCATION: ICD-10-CM

## 2020-12-16 PROCEDURE — 0DB68ZX EXCISION OF STOMACH, VIA NATURAL OR ARTIFICIAL OPENING ENDOSCOPIC, DIAGNOSTIC: ICD-10-PCS | Performed by: INTERNAL MEDICINE

## 2020-12-16 PROCEDURE — 43239 EGD BIOPSY SINGLE/MULTIPLE: CPT | Performed by: INTERNAL MEDICINE

## 2020-12-16 RX ORDER — LIDOCAINE HYDROCHLORIDE 10 MG/ML
INJECTION, SOLUTION EPIDURAL; INFILTRATION; INTRACAUDAL; PERINEURAL AS NEEDED
Status: DISCONTINUED | OUTPATIENT
Start: 2020-12-16 | End: 2020-12-16 | Stop reason: SURG

## 2020-12-16 RX ORDER — NALOXONE HYDROCHLORIDE 0.4 MG/ML
80 INJECTION, SOLUTION INTRAMUSCULAR; INTRAVENOUS; SUBCUTANEOUS AS NEEDED
Status: CANCELLED | OUTPATIENT
Start: 2020-12-16 | End: 2020-12-16

## 2020-12-16 RX ORDER — SODIUM CHLORIDE, SODIUM LACTATE, POTASSIUM CHLORIDE, CALCIUM CHLORIDE 600; 310; 30; 20 MG/100ML; MG/100ML; MG/100ML; MG/100ML
INJECTION, SOLUTION INTRAVENOUS CONTINUOUS
Status: DISCONTINUED | OUTPATIENT
Start: 2020-12-16 | End: 2020-12-16

## 2020-12-16 RX ORDER — SODIUM CHLORIDE, SODIUM LACTATE, POTASSIUM CHLORIDE, CALCIUM CHLORIDE 600; 310; 30; 20 MG/100ML; MG/100ML; MG/100ML; MG/100ML
INJECTION, SOLUTION INTRAVENOUS CONTINUOUS
Status: CANCELLED | OUTPATIENT
Start: 2020-12-16

## 2020-12-16 RX ADMIN — SODIUM CHLORIDE, SODIUM LACTATE, POTASSIUM CHLORIDE, CALCIUM CHLORIDE: 600; 310; 30; 20 INJECTION, SOLUTION INTRAVENOUS at 13:37:00

## 2020-12-16 RX ADMIN — LIDOCAINE HYDROCHLORIDE 50 MG: 10 INJECTION, SOLUTION EPIDURAL; INFILTRATION; INTRACAUDAL; PERINEURAL at 13:19:00

## 2020-12-16 RX ADMIN — SODIUM CHLORIDE, SODIUM LACTATE, POTASSIUM CHLORIDE, CALCIUM CHLORIDE: 600; 310; 30; 20 INJECTION, SOLUTION INTRAVENOUS at 13:19:00

## 2020-12-16 NOTE — OPERATIVE REPORT
[facility] Endoscopy Report      Preoperative Diagnosis:  - abdominal pain  - nausea /vomiting      Postoperative Diagnosis:  - hiatal hernia 3 cm      Procedure:    Esophagogastroduodenoscopy       Surgeon:  Hellen Helms M.D.     Anesthes

## 2020-12-16 NOTE — ANESTHESIA POSTPROCEDURE EVALUATION
Patient: Blase Dancer    Procedure Summary     Date: 12/16/20 Room / Location: [facility] ENDOSCOPY 05 / [facility] ENDOSCOPY    Anesthesia Start: 0494 Anesthesia Stop: 8517    Procedure: ESOPHAGOGASTRODUODENOSCOPY (EGD) (N/A ) Diagnosis:       Abdominal pain, unspeci

## 2020-12-16 NOTE — ANESTHESIA PREPROCEDURE EVALUATION
Anesthesia PreOp Note    HPI:     Rafael Tamez is a 66year old male who presents for preoperative consultation requested by: Kiley Sellers MD    Date of Surgery: 12/16/2020    Procedure(s):  ESOPHAGOGASTRODUODENOSCOPY (EGD)  Indication: Abdominal pa taking: Reported on 9/24/2020 ), Disp: 4 tablet, Rfl: 0    •  latanoprost 0.005 % Ophthalmic Solution, Place 1 drop into both eyes nightly.  , Disp: , Rfl:         •  lactated ringers infusion, , Intravenous, Continuous, Michelle Alexander MD, Last Rate: 20 5\") and weight is 72.6 kg (160 lb). His temperature is 97.3 °F (36.3 °C). His blood pressure is 164/86 (abnormal) and his pulse is 51. His respiration is 16 and oxygen saturation is 98%.     12/16/20  1207 12/16/20  1215   BP: (!) 164/86    Pulse: 51    Re

## 2020-12-16 NOTE — H&P
History & Physical Examination    Patient Name: Tyrone Olguin  MRN: [id number]  CSN: [id number]  YOB: 1945    Diagnosis: abdominal pain  Nausea/vomiting          •  atorvastatin 20 MG Oral Tab, Take 20 mg by mouth daily. , Disp: , Rfl: , 12/15 EXTREMITIES [x ] [x ]    OTHER        [ x ] I have discussed the risks and benefits and alternatives with the patient/family. They understand and agree to proceed with plan of care.   [ x ] I have reviewed the History and Physical done within the last 30

## 2021-01-07 ENCOUNTER — TELEPHONE (OUTPATIENT)
Dept: GASTROENTEROLOGY | Facility: CLINIC | Age: 76
End: 2021-01-07

## 2021-01-07 NOTE — TELEPHONE ENCOUNTER
Son answered the phone and gave me another number to call to reach [facility] #[phone]. Lang answered at this number and reviewed complete result note with the patient using [facility]  Jed #[id number].

## 2021-01-07 NOTE — TELEPHONE ENCOUNTER
I was on hold with language line for >10 min for a [facility] . GI staff please try calling with  at a later time. Thank you.

## 2021-01-07 NOTE — TELEPHONE ENCOUNTER
----- Message from Sathya Flowers MD sent at 1/6/2021  5:43 PM CST -----  I wanted to get back to you with your EGD results. You have a hiatal hernia - this can lead to acid reflux - no eating for 3 hours before bed.      Samples taken were negative for

## 2021-09-09 ENCOUNTER — TELEPHONE (OUTPATIENT)
Dept: GASTROENTEROLOGY | Facility: CLINIC | Age: 76
End: 2021-09-09

## 2021-09-09 DIAGNOSIS — K86.2 PANCREATIC CYST: Primary | ICD-10-CM

## 2021-09-09 NOTE — TELEPHONE ENCOUNTER
Patient outreach message received. TE postponed until closer to due date. \"1 year recall entered into patient outreach in [address] [place] [address] Rd. Next MRI due 10/6/2021. \"

## 2021-09-22 NOTE — TELEPHONE ENCOUNTER
Recall letter with order for MRI and instructions about how to schedule mailed to patient's home address.

## 2021-09-22 NOTE — TELEPHONE ENCOUNTER
Dr. Reddy Lindsey    Patient due for 1 year MRI recall to monitor pancreatic cysts. Please place order if plan still appropriate.     Thank you

## 2021-09-23 NOTE — TELEPHONE ENCOUNTER
Left message to call back using [facility]  [facility] DR DANIA SAUL ID # [id number].     CSS:  Please transfer to RN if patient calls back

## 2021-09-24 NOTE — ANESTHESIA POSTPROCEDURE EVALUATION
Patient: Dayanara Mcpherson    Procedure Summary     Date:  07/15/19 Room / Location:  [facility] ENDOSCOPY 01 / [facility] ENDOSCOPY    Anesthesia Start:  5458 Anesthesia Stop:      Procedures:       COLONOSCOPY (N/A )      ESOPHAGOGASTRODUODENOSCOPY (EGD) (N/A ) Diagnosis
Patent

## 2021-09-24 NOTE — TELEPHONE ENCOUNTER
Left message for patient to call back on both home and mobile number using [facility]  Marya ID [id number]

## 2021-09-27 NOTE — TELEPHONE ENCOUNTER
SARAH Riggs    I spoke to the patient and let him know that he is due for a repeat MRI and provided number to call and schedule.   He argued that he already had 3 of them (I only see that he had one) and will discuss with his family and let us know his de

## 2021-09-30 NOTE — TELEPHONE ENCOUNTER
I left a message for the patient on the voicemail/work number. Would strongly suggest that he continue follow-up and observation on the pancreatic lesions.   As previous described by nursing staff these can change or grow/malignant transformation is possib

## 2021-11-04 NOTE — TELEPHONE ENCOUNTER
Patient never returned to call, so I called to follow up.  Trini Catching ID # [id number]    Tried to call home number-someone answered and hung up. Tried to call mobile number, there was no answer and left message to call back.

## 2021-11-05 NOTE — TELEPHONE ENCOUNTER
I printed and mailed the letter to patient's home address via Certified Mail    Article number # [id number]

## 2021-11-05 NOTE — TELEPHONE ENCOUNTER
Dr. Cassius Jovel    I tried to reach out to the patient because he has not called us back. His son answered the phone. I asked if there was an alternate number to reach [facility] and he told me that he has [employer]'s phone and he is not available.  I let him know

## 2021-11-26 NOTE — TELEPHONE ENCOUNTER
Receipt to confirm that certified letter was delivered was received. Patient received the letter on 11/12/2021.

## 2022-03-30 ENCOUNTER — HOSPITAL ENCOUNTER (OUTPATIENT)
Dept: CV DIAGNOSTICS | Facility: HOSPITAL | Age: 77
Discharge: HOME | End: 2022-03-30
Attending: FAMILY MEDICINE
Payer: MEDICAID

## 2022-03-30 DIAGNOSIS — R00.1 BRADYCARDIA: ICD-10-CM

## 2022-03-30 PROCEDURE — 93242 EXT ECG>48HR<7D RECORDING: CPT | Performed by: FAMILY MEDICINE

## 2022-03-30 PROCEDURE — 93243 EXT ECG>48HR<7D SCAN A/R: CPT | Performed by: FAMILY MEDICINE

## 2023-01-04 ENCOUNTER — OFFICE VISIT (OUTPATIENT)
Dept: DERMATOLOGY CLINIC | Facility: CLINIC | Age: 78
End: 2023-01-04
Payer: MEDICAID

## 2023-01-04 DIAGNOSIS — D49.2 NEOPLASM OF UNSPECIFIED BEHAVIOR OF BONE, SOFT TISSUE, AND SKIN: Primary | ICD-10-CM

## 2023-01-04 DIAGNOSIS — L57.0 MULTIPLE ACTINIC KERATOSES: ICD-10-CM

## 2023-01-04 PROCEDURE — 11103 TANGNTL BX SKIN EA SEP/ADDL: CPT | Performed by: STUDENT IN AN ORGANIZED HEALTH CARE EDUCATION/TRAINING PROGRAM

## 2023-01-04 PROCEDURE — 17000 DESTRUCT PREMALG LESION: CPT | Performed by: STUDENT IN AN ORGANIZED HEALTH CARE EDUCATION/TRAINING PROGRAM

## 2023-01-04 PROCEDURE — 88305 TISSUE EXAM BY PATHOLOGIST: CPT | Performed by: STUDENT IN AN ORGANIZED HEALTH CARE EDUCATION/TRAINING PROGRAM

## 2023-01-04 PROCEDURE — 17003 DESTRUCT PREMALG LES 2-14: CPT | Performed by: STUDENT IN AN ORGANIZED HEALTH CARE EDUCATION/TRAINING PROGRAM

## 2023-01-04 PROCEDURE — 11102 TANGNTL BX SKIN SINGLE LES: CPT | Performed by: STUDENT IN AN ORGANIZED HEALTH CARE EDUCATION/TRAINING PROGRAM

## 2023-01-04 RX ORDER — TAMSULOSIN HYDROCHLORIDE 0.4 MG/1
1 CAPSULE ORAL DAILY
COMMUNITY
Start: 2021-02-26

## 2023-01-04 RX ORDER — PANTOPRAZOLE SODIUM 40 MG/1
1 TABLET, DELAYED RELEASE ORAL DAILY
COMMUNITY

## 2023-01-04 RX ORDER — ASPIRIN 81 MG/1
TABLET ORAL
COMMUNITY

## 2023-01-04 RX ORDER — LISINOPRIL 10 MG/1
TABLET ORAL
COMMUNITY

## 2023-01-04 RX ORDER — ONDANSETRON 4 MG/1
TABLET, ORALLY DISINTEGRATING ORAL
COMMUNITY

## 2023-01-04 RX ORDER — ERGOCALCIFEROL 1.25 MG/1
1 CAPSULE ORAL WEEKLY
COMMUNITY

## 2023-01-04 RX ORDER — CITALOPRAM 20 MG/1
TABLET ORAL
COMMUNITY

## 2023-01-04 RX ORDER — KETOROLAC TROMETHAMINE 4 MG/ML
SOLUTION/ DROPS OPHTHALMIC
COMMUNITY
Start: 2021-03-15

## 2023-01-04 RX ORDER — PREDNISOLONE ACETATE 10 MG/ML
SUSPENSION/ DROPS OPHTHALMIC
COMMUNITY
Start: 2021-03-15

## 2023-01-04 RX ORDER — EZETIMIBE 10 MG/1
1 TABLET ORAL DAILY
COMMUNITY

## 2023-01-04 RX ORDER — TOBRAMYCIN 3 MG/ML
SOLUTION/ DROPS OPHTHALMIC
COMMUNITY
Start: 2021-03-15

## 2023-01-09 NOTE — PROGRESS NOTES
Logged in path book and lesion B in pmh. Pt's low informed of pt's pathology (ASHLEIGH in pt's chart) and provided with contact information for Dr. Leela Grady. Pt's pathology routed to Dr. Amy Masters office. Pt's low advised of pt's f/u recommendation in 3 months.

## 2023-02-06 ENCOUNTER — MED REC SCAN ONLY (OUTPATIENT)
Dept: DERMATOLOGY CLINIC | Facility: CLINIC | Age: 78
End: 2023-02-06

## 2023-09-20 ENCOUNTER — OFFICE VISIT (OUTPATIENT)
Dept: DERMATOLOGY CLINIC | Facility: CLINIC | Age: 78
End: 2023-09-20

## 2023-09-20 DIAGNOSIS — L57.0 MULTIPLE ACTINIC KERATOSES: ICD-10-CM

## 2023-09-20 DIAGNOSIS — D18.01 CHERRY ANGIOMA: ICD-10-CM

## 2023-09-20 DIAGNOSIS — L82.1 SEBORRHEIC KERATOSES: Primary | ICD-10-CM

## 2023-09-20 DIAGNOSIS — L81.4 LENTIGINES: ICD-10-CM

## 2023-09-20 DIAGNOSIS — D22.9 MULTIPLE BENIGN NEVI: ICD-10-CM

## 2023-09-20 DIAGNOSIS — D49.2 NEOPLASM OF UNSPECIFIED BEHAVIOR OF BONE, SOFT TISSUE, AND SKIN: ICD-10-CM

## 2023-09-20 PROCEDURE — 11102 TANGNTL BX SKIN SINGLE LES: CPT | Performed by: STUDENT IN AN ORGANIZED HEALTH CARE EDUCATION/TRAINING PROGRAM

## 2023-09-20 PROCEDURE — 99213 OFFICE O/P EST LOW 20 MIN: CPT | Performed by: STUDENT IN AN ORGANIZED HEALTH CARE EDUCATION/TRAINING PROGRAM

## 2023-09-20 PROCEDURE — 17004 DESTROY PREMAL LESIONS 15/>: CPT | Performed by: STUDENT IN AN ORGANIZED HEALTH CARE EDUCATION/TRAINING PROGRAM

## 2023-09-20 PROCEDURE — 88305 TISSUE EXAM BY PATHOLOGIST: CPT | Performed by: STUDENT IN AN ORGANIZED HEALTH CARE EDUCATION/TRAINING PROGRAM

## 2023-09-20 NOTE — PROGRESS NOTES
September 20, 2023    Established patient     CHIEF COMPLAINT: UBSE    HISTORY OF PRESENT ILLNESS: .    1. Lesion  Location: left ear   Duration: over 1 year  Signs and symptoms: none  Current treatment: biopsy  Past treatments: biopsy    2. lesion  Location: forehead   Duration: 6 months  Signs and symptoms: none  Current treatment: none  Past treatments: none        - No particular lesions of concern. DERM HISTORY:  History of skin cancer: Yes--Aks and SCC    FAMILY HISTORY:  History of melanoma: No    PAST MEDICAL HISTORY:  Past Medical History:   Diagnosis Date    Deep vein thrombosis (HCC)     Essential hypertension     Glaucoma     High blood pressure     High cholesterol     HYPERTENSION 2010    Pulmonary embolism (HCC)     SCC (squamous cell carcinoma) 01/2023    Right ear       REVIEW OF SYSTEMS:  Constitutional: Denies fever, chills, unintentional weight loss. Skin as per [facility] [place]    Medications  Current Outpatient Medications   Medication Sig Dispense Refill    aspirin 81 MG Oral Tab EC Take 1 tablet every day by oral route as directed for 30 days. cholecalciferol 1.25 MG (95426 UT) Oral Cap Take 1 capsule by mouth once a week. citalopram 20 MG Oral Tab       ergocalciferol 1.25 MG (60835 UT) Oral Cap Take 1 capsule by mouth once a week.      ezetimibe 10 MG Oral Tab Take 1 tablet by mouth daily. Ketorolac Tromethamine 0.4 % Ophthalmic Solution 1 gtt in surgical eye once daily starting 3 days before surgery and continue after as directed. TO REPLACE BROMSITE (Patient not taking: Reported on 1/4/2023)      lisinopril 10 MG Oral Tab TAKE ONE TABLET BY MOUTH EVERY DAY AS DIRECTED      ondansetron 4 MG Oral Tablet Dispersible       pantoprazole 40 MG Oral Tab EC Take 1 tablet by mouth daily. prednisoLONE 1 % Ophthalmic Suspension POST-OP: 1 gtt in surgical eye BID x 1 week then QD x 3 weeks after surgery as directed.   TO REPLACE DUREZOL (Patient not taking: Reported on 1/4/2023) tamsulosin 0.4 MG Oral Cap Take 1 capsule by mouth daily. tobramycin 0.3 % Ophthalmic Solution 1 gtt in surgical eye TID starting 3 days before surgery and continue after as directed. TO REPLACE BESIVANCE (Patient not taking: Reported on 1/4/2023)      triamcinolone 0.1 % External Ointment APPLY A THIN LAYER TO THE AFFECTED AREA(S) BY TOPICAL ROUTE 2 TIMES PER DAY (Patient not taking: Reported on 1/4/2023)      amLODIPine Besylate 5 MG Oral Tab Take 5 mg by mouth daily. atorvastatin 20 MG Oral Tab Take 20 mg by mouth daily. aspirin 81 MG Oral Tab Take 81 mg by mouth daily. Dorzolamide HCl-Timolol Mal 22.3-6.8 MG/ML Ophthalmic Solution Place 1 drop into both eyes 3 (three) times daily. 1 Bottle 0    Phenazopyridine HCl 100 MG Oral Tab Take 1 tablet (100 mg total) by mouth 2 (two) times daily as needed. 4 tablet 0    Lisinopril-Hydrochlorothiazide 10-12.5 MG Oral Tab Take 1 tablet by mouth daily. latanoprost 0.005 % Ophthalmic Solution Place 1 drop into both eyes nightly. PHYSICAL EXAM:  Patient declined chaperone   General: awake, alert, no acute distress  Skin: Skin exam was performed today including the following: head and face, scalp, neck, chest (including breasts and axillae), abdomen, back, bilateral upper extremities, bilateral lower extremities, hands, feet, digits, nails. Pertinent findings include:   - Scattered bright red-purple dome-shaped papules on the trunk and extremities   - Scattered light brown stellate macules on sun exposed sites  - Scattered, evenly colored, round brown macules and papules with regular borders on the trunk and extremities  - Numerous scattered skin-colored and brown, waxy, stuck-on papules and plaques on the trunk and extremities  - L chest with a skin colored papules  - Face and scalp with numerous pink gritty papules    ASSESSMENT & PLAN:  Pathophysiology of diagnoses discussed with patient. Therapeutic options reviewed.  Risks, benefits, and alternatives discussed with patient. Instructions reviewed at length. #Lentigines  #Seborrheic keratoses   #Cherry angiomas   - Reassurance provided regarding the benign nature of these lesions. #Multiple benign nevi  - Complete skin exam performed today with no outlier lesions identified   - Reassured patient of benign nature of these lesions.   - Recommend daily photoprotection with broad-spectrum sunscreen, avoidance of sun during peak hours, and sun protective clothing.    - Dermoscopy was used for physical examination of pigmented lesions during today's office visit. #Neoplasm(s) of uncertain behavior of skin  - Shave biopsy performed today   - Will notify patient with results and arrange for appropriate definitive treatment, if indicated. Shave of lesion to establish and confirm diagnosis:  Photo taken: Yes    Risks, benefits, alternatives and personnel required for shave biopsy reviewed with patient. Risks discussed include, but not limited to: pain, bleeding, infection, scar, reaction to anesthetic, and recurrence/need for further treatment. Patient and physician agree as to site(s) to be biopsied. Patient verbalizes understanding and wishes to proceed. Site(s) prepped with alcohol and anesthetized with 1% lidocaine with epinephrine. Shave of lesion(s) performed to the level of the dermis. Specimen(s) from A. L chest  sent for pathology to r/o  nevus  50% ALCL and bandaging applied. Written and verbal wound care instructions provided to patient, understanding verbalized. #Multiple actinic keratoses  - Discussed premalignant etiology and possibility of transformation to [facility]  - Recommended cryotherapy today   - Discussed side effects including redness, swelling, crusting, and discolortion after treatment, wound care with soap/water and vaseline   - Recommend sun protection with spf 30 or higher, sun protective clothing such as wide brimmed hats and long sleeves.  Recommend avoiding midday sun (10 am- 3 pm). - Procedure Note Cryosurgery of pre-malignant lesion(s)  Risks, benefits, alternatives, complications, and personnel required for cryosurgery reviewed with patient. Patient verbalizes understanding and wishes to proceed. - Cryosurgery performed with Liquid Nitrogen via cryostat spray gun to Actinic Keratosis . 16 lesion(s) treated. - Patient tolerated well and wound care discussed. Return if lesions fail to fully resolve.       Return to clinic: 6 months or sooner if something concerning arises     Reyes Erps, MD

## 2023-09-26 ENCOUNTER — TELEPHONE (OUTPATIENT)
Dept: DERMATOLOGY CLINIC | Facility: CLINIC | Age: 78
End: 2023-09-26

## 2024-03-12 ENCOUNTER — HOSPITAL ENCOUNTER (EMERGENCY)
Facility: HOSPITAL | Age: 79
Discharge: LEFT BEFORE DISCHARGE | End: 2024-03-12
Payer: MEDICAID

## 2024-03-12 VITALS
DIASTOLIC BLOOD PRESSURE: 64 MMHG | HEART RATE: 61 BPM | OXYGEN SATURATION: 96 % | RESPIRATION RATE: 20 BRPM | TEMPERATURE: 98 F | SYSTOLIC BLOOD PRESSURE: 125 MMHG

## 2024-03-12 NOTE — ED INITIAL ASSESSMENT (HPI)
Patient arrives ambulatory through triage with complaints of low blood pressure readings at home. Recently stopped BP meds on his own.   Patient reports feeling fine.

## 2024-04-21 ENCOUNTER — HOSPITAL ENCOUNTER (EMERGENCY)
Facility: HOSPITAL | Age: 79
Discharge: HOME | End: 2024-04-21
Attending: EMERGENCY MEDICINE
Payer: MEDICAID

## 2024-04-21 VITALS
RESPIRATION RATE: 19 BRPM | WEIGHT: 170 LBS | BODY MASS INDEX: 27.32 KG/M2 | SYSTOLIC BLOOD PRESSURE: 146 MMHG | HEART RATE: 56 BPM | TEMPERATURE: 98 F | HEIGHT: 66 IN | DIASTOLIC BLOOD PRESSURE: 67 MMHG | OXYGEN SATURATION: 95 %

## 2024-04-21 DIAGNOSIS — M25.50 POLYARTHRALGIA: Primary | ICD-10-CM

## 2024-04-21 LAB
ANION GAP SERPL CALC-SCNC: 7 MMOL/L (ref 0–18)
BASOPHILS # BLD AUTO: 0.04 X10(3) UL (ref 0–0.2)
BASOPHILS NFR BLD AUTO: 0.5 %
BUN BLD-MCNC: 22 MG/DL (ref 9–23)
BUN/CREAT SERPL: 17.7 (ref 10–20)
CALCIUM BLD-MCNC: 9.4 MG/DL (ref 8.7–10.4)
CHLORIDE SERPL-SCNC: 111 MMOL/L (ref 98–112)
CO2 SERPL-SCNC: 22 MMOL/L (ref 21–32)
CREAT BLD-MCNC: 1.24 MG/DL
CRP SERPL-MCNC: 2.9 MG/DL (ref ?–1)
DEPRECATED RDW RBC AUTO: 42.2 FL (ref 35.1–46.3)
EGFRCR SERPLBLD CKD-EPI 2021: 59 ML/MIN/1.73M2 (ref 60–?)
EOSINOPHIL # BLD AUTO: 0.24 X10(3) UL (ref 0–0.7)
EOSINOPHIL NFR BLD AUTO: 2.8 %
ERYTHROCYTE [DISTWIDTH] IN BLOOD BY AUTOMATED COUNT: 13 % (ref 11–15)
ERYTHROCYTE [SEDIMENTATION RATE] IN BLOOD: 67 MM/HR
FLUAV + FLUBV RNA SPEC NAA+PROBE: NEGATIVE
FLUAV + FLUBV RNA SPEC NAA+PROBE: NEGATIVE
GLUCOSE BLD-MCNC: 143 MG/DL (ref 70–99)
HCT VFR BLD AUTO: 41.1 %
HGB BLD-MCNC: 13.8 G/DL
IMM GRANULOCYTES # BLD AUTO: 0.05 X10(3) UL (ref 0–1)
IMM GRANULOCYTES NFR BLD: 0.6 %
LYMPHOCYTES # BLD AUTO: 1.5 X10(3) UL (ref 1–4)
LYMPHOCYTES NFR BLD AUTO: 17.6 %
MCH RBC QN AUTO: 29.7 PG (ref 26–34)
MCHC RBC AUTO-ENTMCNC: 33.6 G/DL (ref 31–37)
MCV RBC AUTO: 88.6 FL
MONOCYTES # BLD AUTO: 0.74 X10(3) UL (ref 0.1–1)
MONOCYTES NFR BLD AUTO: 8.7 %
NEUTROPHILS # BLD AUTO: 5.96 X10 (3) UL (ref 1.5–7.7)
NEUTROPHILS # BLD AUTO: 5.96 X10(3) UL (ref 1.5–7.7)
NEUTROPHILS NFR BLD AUTO: 69.8 %
OSMOLALITY SERPL CALC.SUM OF ELEC: 296 MOSM/KG (ref 275–295)
PLATELET # BLD AUTO: 205 10(3)UL (ref 150–450)
POTASSIUM SERPL-SCNC: 3.9 MMOL/L (ref 3.5–5.1)
RBC # BLD AUTO: 4.64 X10(6)UL
RSV RNA SPEC NAA+PROBE: NEGATIVE
SARS-COV-2 RNA RESP QL NAA+PROBE: NOT DETECTED
SODIUM SERPL-SCNC: 140 MMOL/L (ref 136–145)
URATE SERPL-MCNC: 6.1 MG/DL
WBC # BLD AUTO: 8.5 X10(3) UL (ref 4–11)

## 2024-04-21 PROCEDURE — 85652 RBC SED RATE AUTOMATED: CPT | Performed by: EMERGENCY MEDICINE

## 2024-04-21 PROCEDURE — 84550 ASSAY OF BLOOD/URIC ACID: CPT | Performed by: EMERGENCY MEDICINE

## 2024-04-21 PROCEDURE — 80048 BASIC METABOLIC PNL TOTAL CA: CPT | Performed by: EMERGENCY MEDICINE

## 2024-04-21 PROCEDURE — 0241U SARS-COV-2/FLU A AND B/RSV BY PCR (GENEXPERT): CPT | Performed by: EMERGENCY MEDICINE

## 2024-04-21 PROCEDURE — 99283 EMERGENCY DEPT VISIT LOW MDM: CPT

## 2024-04-21 PROCEDURE — 85025 COMPLETE CBC W/AUTO DIFF WBC: CPT | Performed by: EMERGENCY MEDICINE

## 2024-04-21 PROCEDURE — 86140 C-REACTIVE PROTEIN: CPT | Performed by: EMERGENCY MEDICINE

## 2024-04-21 PROCEDURE — 36415 COLL VENOUS BLD VENIPUNCTURE: CPT

## 2024-04-21 NOTE — ED INITIAL ASSESSMENT (HPI)
Lang arrived through triage with family for c/o numbness and tingling in both hands with associated swelling to his fingers for the past 2 weeks or so. Also reporting generalized discomfort to his back and lower extremities. Denies episodes of incontinence. No c/o gait,visual or speech disturbances. Family also reporting low-grade temps managed with Tylenol.

## 2024-04-21 NOTE — DISCHARGE INSTRUCTIONS
Tylenol arthritis for pain  Follow-up with rheumatology and your doctor  Return if worsening swelling fever

## 2024-04-21 NOTE — ED PROVIDER NOTES
Patient Seen in: [facility] Emergency Department      History     Chief Complaint   Patient presents with    Numbness Weakness     Stated Complaint: numbness x2 weeks    Subjective:   HPI    The patient is an 78-year-old male with a history of hypertension who presents with 2 weeks of diffuse joint pain fatigue body aches and low-grade fevers.  Symptoms improved with ibuprofen and then recur.  Last week he did have 3 to 4 days of diarrhea while on a cruise but that has resolved.  No abdominal pain.  No cough or shortness of breath.  No headache.  He is intermittent swelling to his hands.  Pain to the knees wrists shoulders and chronic low back pain.  No dysuria urgency frequency or hematuria.  Normal appetite.  No nausea or vomiting.    Objective:   Past Medical History:    Deep vein thrombosis (HCC)    Essential hypertension    Glaucoma    High blood pressure    High cholesterol    HYPERTENSION    Pulmonary embolism (HCC)    SCC (squamous cell carcinoma)    Right ear              Past Surgical History:   Procedure Laterality Date    Colonoscopy N/A 7/15/2019    Procedure: COLONOSCOPY;  Surgeon: Yaya Alexander MD;  Location: [facility] ENDOSCOPY                Social History     Socioeconomic History    Marital status:    Tobacco Use    Smoking status: Never    Smokeless tobacco: Never   Vaping Use    Vaping status: Never Used   Substance and Sexual Activity    Alcohol use: No    Drug use: No   Other Topics Concern    Grew up on a farm No    History of tanning Yes    Outdoor occupation No    Reaction to local anesthetic No    Pt has a pacemaker No    Pt has a defibrillator No              Review of Systems    Positive for stated complaint: numbness x2 weeks  Other systems are as noted in HPI.  Constitutional and vital signs reviewed.      All other systems reviewed and negative except as noted above.    Physical Exam     ED Triage Vitals [04/21/24 0919]   /85   Pulse 60   Resp 16   Temp 97.6 °F  (36.4 °C)   Temp src Temporal   SpO2 97 %   O2 Device None (Room air)       Current:/67   Pulse 56   Temp 97.6 °F (36.4 °C) (Temporal)   Resp 19   Ht 167.6 cm (5' 6\")   Wt 77.1 kg   SpO2 95%   BMI 27.44 kg/m²         Physical Exam  Vitals and nursing note reviewed.   Constitutional:       General: He is not in acute distress.     Appearance: Normal appearance. He is well-developed. He is not ill-appearing.   HENT:      Head: Normocephalic and atraumatic.      Mouth/Throat:      Mouth: Mucous membranes are moist.      Pharynx: Oropharynx is clear.   Eyes:      Extraocular Movements: Extraocular movements intact.      Conjunctiva/sclera: Conjunctivae normal.   Neck:      Vascular: No JVD.   Cardiovascular:      Rate and Rhythm: Normal rate and regular rhythm.      Heart sounds: Normal heart sounds. No murmur heard.  Pulmonary:      Effort: Pulmonary effort is normal.      Breath sounds: Normal breath sounds.   Abdominal:      General: Bowel sounds are normal. There is no distension.      Palpations: Abdomen is soft. There is no mass.      Tenderness: There is no abdominal tenderness. There is no guarding or rebound.   Musculoskeletal:      Right shoulder: Decreased range of motion (Pain with range of motion bilaterally but no significant effusion warmth or erythema).      Left shoulder: Decreased range of motion.      Right wrist: Swelling and tenderness present. No crepitus. Decreased range of motion. Normal pulse.      Left wrist: Swelling and tenderness present. No crepitus. Decreased range of motion. Normal pulse.      Cervical back: Normal range of motion and neck supple.      Lumbar back: Tenderness (Lumbar paraspinal) present. No bony tenderness. Normal range of motion.      Right knee: No effusion, bony tenderness or crepitus. Tenderness (Pain with range of motion bilaterally) present.      Left knee: No effusion, bony tenderness or crepitus. Tenderness present.      Right lower leg: No edema.       Left lower leg: No edema.      Comments: Wrist tender with effusions pain with range of motion no warmth or erythema pulses strong and equal  Ankles pain with range of motion but no effusions warmth or erythema   Skin:     General: Skin is warm and dry.      Capillary Refill: Capillary refill takes less than 2 seconds.      Findings: No rash.   Neurological:      General: No focal deficit present.      Mental Status: He is alert and oriented to person, place, and time.      Sensory: No sensory deficit.      Motor: No weakness.      Gait: Gait normal.      Deep Tendon Reflexes: Reflexes are normal and symmetric.   Psychiatric:         Judgment: Judgment normal.       Differential diagnosis includes rheumatoid arthritis, gout, viral syndrome, PMR        ED Course     Labs Reviewed   BASIC METABOLIC PANEL (8) - Abnormal; Notable for the following components:       Result Value    Glucose 143 (*)     Calculated Osmolality 296 (*)     eGFR-Cr 59 (*)     All other components within normal limits   SED RATE, WESTERGREN (AUTOMATED) - Abnormal; Notable for the following components:    Sed Rate 67 (*)     All other components within normal limits   C-REACTIVE PROTEIN - Abnormal; Notable for the following components:    C-Reactive Protein 2.90 (*)     All other components within normal limits   URIC ACID - Normal   SARS-COV-2/FLU A AND B/RSV BY PCR (GENEXPERT) - Normal    Narrative:     This test is intended for the qualitative detection and differentiation of SARS-CoV-2, influenza A, influenza B, and respiratory syncytial virus (RSV) viral RNA in nasopharyngeal or nares swabs from individuals suspected of respiratory viral infection consistent with COVID-19 by their healthcare provider. Signs and symptoms of respiratory viral infection due to SARS-CoV-2, influenza, and RSV can be similar.    Test performed using the Xpert Xpress SARS-CoV-2/FLU/RSV (real time RT-PCR)  assay on the GeneXpert instrument, [employer], [place]  [address].   This test is being used under the Food and Drug Administration's Emergency Use Authorization.    The authorized Fact Sheet for Healthcare Providers for this assay is available upon request from the laboratory.   CBC WITH DIFFERENTIAL WITH PLATELET    Narrative:     The following orders were created for panel order CBC With Differential With Platelet.  Procedure                               Abnormality         Status                     ---------                               -----------         ------                     CBC W/ DIFFERENTIAL[[id number]]                              Final result                 Please view results for these tests on the individual orders.   RAINBOW DRAW LAVENDER   RAINBOW DRAW LIGHT GREEN   RAINBOW DRAW BLUE   CBC W/ DIFFERENTIAL                      MDM                                         Medical Decision Making  Patient with polyarthralgia etiology unclear  ESR and CRP elevated  Referral to rheumatology  Naproxen with food and Tylenol for pain  Patient to return if symptoms worsen or high fever  Neurovasc intact prior to discharge no evidence of septic joints    Problems Addressed:  Polyarthralgia: acute illness or injury    Amount and/or Complexity of Data Reviewed  Independent Historian:      Details: Family assisting with history  Labs: ordered.    Risk  OTC drugs.  Risk Details: Dosage and side effect discussed with patient and family        Disposition and Plan     Clinical Impression:  1. Polyarthralgia         Disposition:  Discharge  4/21/2024  1:17 pm    Follow-up:  Aylin Mathis MD  [address] [id number] [phone]    Schedule an appointment as soon as possible for a visit in 2 day(s)      We recommend that you schedule follow up care with a primary care provider within the next three months to obtain basic health screening including reassessment of your blood pressure.      Medications Prescribed:  Current Discharge  Medication List

## 2024-04-22 ENCOUNTER — TELEPHONE (OUTPATIENT)
Dept: RHEUMATOLOGY | Facility: CLINIC | Age: 79
End: 2024-04-22

## 2024-04-22 NOTE — TELEPHONE ENCOUNTER
Patients granddaughter called requesting a sooner appointment than 05/29/2024. I informed her there are no openings before then with any of our providers. She stated he was seen in the ER over the weekend and was instructed to see someone within two days.

## 2024-04-22 NOTE — TELEPHONE ENCOUNTER
I do not have anything sooner.  We can see if Dr. Mathis or Dr. Stroud has a sooner appt or he will have to put on the wait list to be seen sooner

## 2024-04-22 NOTE — TELEPHONE ENCOUNTER
Please call patient and schedule an appt. With either  or .if there's anything sooner than his appt.. if not put him on the wait list

## 2024-04-22 NOTE — TELEPHONE ENCOUNTER
Phoned Mary. Offered appt on 5/2/24 in [place] with Dr Mathis; appt accepted and scheduled. Mary agreed to cancel appt later in month with Dr Raphael.

## 2024-04-22 NOTE — TELEPHONE ENCOUNTER
Granddaughter returning calling, asking if patient can be seen sooner by Dr. Mathis or Dr. Stroud, no sooner appointments, asking if Dr. Mathis or Dr. Stroud can fit patient in sooner.

## 2024-05-01 ENCOUNTER — TELEPHONE (OUTPATIENT)
Facility: CLINIC | Age: 79
End: 2024-05-01

## 2024-05-01 NOTE — TELEPHONE ENCOUNTER
----- Message from Sadie Zarate CMA sent at 7/17/2019  4:00 PM CDT -----  Regarding: recall colon  Recall colonn for 5 years per  Last colon done 7/15/19

## 2024-05-02 ENCOUNTER — OFFICE VISIT (OUTPATIENT)
Dept: RHEUMATOLOGY | Facility: CLINIC | Age: 79
End: 2024-05-02

## 2024-05-02 ENCOUNTER — LAB ENCOUNTER (OUTPATIENT)
Dept: LAB | Age: 79
End: 2024-05-02
Attending: INTERNAL MEDICINE
Payer: MEDICAID

## 2024-05-02 VITALS
HEIGHT: 66 IN | HEART RATE: 60 BPM | SYSTOLIC BLOOD PRESSURE: 140 MMHG | OXYGEN SATURATION: 96 % | BODY MASS INDEX: 27 KG/M2 | DIASTOLIC BLOOD PRESSURE: 80 MMHG

## 2024-05-02 DIAGNOSIS — M79.10 MYALGIA: ICD-10-CM

## 2024-05-02 DIAGNOSIS — M35.3 POLYMYALGIA RHEUMATICA (HCC): ICD-10-CM

## 2024-05-02 DIAGNOSIS — M35.3 POLYMYALGIA RHEUMATICA (HCC): Primary | ICD-10-CM

## 2024-05-02 LAB
CK SERPL-CCNC: 27 U/L
RHEUMATOID FACT SERPL-ACNC: 141 IU/ML (ref ?–14)

## 2024-05-02 PROCEDURE — 82164 ANGIOTENSIN I ENZYME TEST: CPT

## 2024-05-02 PROCEDURE — 99204 OFFICE O/P NEW MOD 45 MIN: CPT | Performed by: INTERNAL MEDICINE

## 2024-05-02 PROCEDURE — 36415 COLL VENOUS BLD VENIPUNCTURE: CPT

## 2024-05-02 PROCEDURE — 82550 ASSAY OF CK (CPK): CPT

## 2024-05-02 PROCEDURE — 83516 IMMUNOASSAY NONANTIBODY: CPT

## 2024-05-02 PROCEDURE — 86431 RHEUMATOID FACTOR QUANT: CPT

## 2024-05-02 PROCEDURE — 86038 ANTINUCLEAR ANTIBODIES: CPT

## 2024-05-02 PROCEDURE — 82085 ASSAY OF ALDOLASE: CPT

## 2024-05-02 PROCEDURE — 86200 CCP ANTIBODY: CPT

## 2024-05-02 PROCEDURE — 86225 DNA ANTIBODY NATIVE: CPT

## 2024-05-02 PROCEDURE — 86037 ANCA TITER EACH ANTIBODY: CPT

## 2024-05-02 RX ORDER — PREDNISONE 20 MG/1
40 TABLET ORAL DAILY
COMMUNITY
Start: 2024-04-24

## 2024-05-02 RX ORDER — PREDNISONE 10 MG/1
TABLET ORAL
Qty: 165 TABLET | Refills: 0 | Status: SHIPPED | OUTPATIENT
Start: 2024-05-02

## 2024-05-02 NOTE — PATIENT INSTRUCTIONS
Check labs   Cont. Prednisone 40mg a day - ok to take it 20mg twice ad ay but if not able to sleep - take two pills in the morning.   Take to 2 weeks, then taper to 30mg a day x 2 weeks.   3.  Return to clinic in 1 month. June 3rd at 12:40 pm

## 2024-05-02 NOTE — PROGRESS NOTES
Lang Kramer is a 78 year old male who presents for   Chief Complaint   Patient presents with    Arm Pain     Muscles,weakness    Fever   .   HPI:     I had the pleasure of seeing Lang Kramer on 5/2/2024 for evaluation.     He is a pleasant 78 year old with sudden polyarthralgia and fever x 2months, and elevated sed rate 67mm/hr. He was referred by Dr. Devlin.   About 2 months ago he got sick with high fever. He went to the ER but never got seen. He recovered but fever continued low grade 36-38 degrees C and he felt better  but not 100%.  - mild coughing ,and fever. The cough stopped and the low grade fevers continued. He did labs and urine test and that was ok.   Then in a few days after the fevere -  he wasn't able to lift his arms over his head or get up from the bed. His legs started hurting after his arms. He had extreme fatigue.   Was taking ibuprofen and tylenol.   The pain would isamar in the hands and go up his arms.   He went to ER in 4/21/2024 - and was diagnosed with a rheumatologic issues.   He was also started on prednisone 40mg ad ay - it's helping partially by Dr. Paredes. He felt so much better. He was started on 60mg but he's only taking 40mg going 1-2 week.   He has labs on 4/21/2024 - esr is 67mm/hr. , crp is 2.2 mg/dL- covide, flu and rsv negative   The fever is consistent - until he took the prednisone. - happening at night. He has night sweats and stilll has those .   His arms are still weak.     No rashes.   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   Was getting woozy.   No jaw pain.   No numbness or tingling. It's better with the prednisone - he was getting numbness in his hands in the morning.   No neck pain or lumbar psin.     He was weak overall.     He has about 2/10 pain. The pain prior to 10/10.     2 months ago everyone was sick, has grandkids -     Wt Readings from Last 2 Encounters:   04/21/24 170 lb (77.1 kg)   12/16/20 160 lb (72.6 kg)     Body mass index is 27.44 kg/m².      Current  Outpatient Medications   Medication Sig Dispense Refill    predniSONE 20 MG Oral Tab Take 2 tablets (40 mg total) by mouth daily.      aspirin 81 MG Oral Tab EC Take 1 tablet every day by oral route as directed for 30 days.      cholecalciferol 1.25 MG (63717 UT) Oral Cap Take 1 capsule (50,000 Units total) by mouth once a week.      citalopram 20 MG Oral Tab       ergocalciferol 1.25 MG (67836 UT) Oral Cap Take 1 capsule (50,000 Units total) by mouth once a week.      ezetimibe 10 MG Oral Tab Take 1 tablet (10 mg total) by mouth daily.      lisinopril 10 MG Oral Tab TAKE ONE TABLET BY MOUTH EVERY DAY AS DIRECTED      ondansetron 4 MG Oral Tablet Dispersible       pantoprazole 40 MG Oral Tab EC Take 1 tablet (40 mg total) by mouth daily.      tamsulosin 0.4 MG Oral Cap Take 1 capsule (0.4 mg total) by mouth daily.      tobramycin 0.3 % Ophthalmic Solution       triamcinolone 0.1 % External Ointment       amLODIPine Besylate 5 MG Oral Tab Take 1 tablet (5 mg total) by mouth daily.      atorvastatin 20 MG Oral Tab Take 1 tablet (20 mg total) by mouth daily.      aspirin 81 MG Oral Tab Take 1 tablet (81 mg total) by mouth daily.      Dorzolamide HCl-Timolol Mal 22.3-6.8 MG/ML Ophthalmic Solution Place 1 drop into both eyes 3 (three) times daily. 1 Bottle 0    Phenazopyridine HCl 100 MG Oral Tab Take 1 tablet (100 mg total) by mouth 2 (two) times daily as needed. 4 tablet 0    Lisinopril-Hydrochlorothiazide 10-12.5 MG Oral Tab Take 1 tablet by mouth daily.      latanoprost 0.005 % Ophthalmic Solution Place 1 drop into both eyes nightly.      Ketorolac Tromethamine 0.4 % Ophthalmic Solution 1 gtt in surgical eye once daily starting 3 days before surgery and continue after as directed.  TO REPLACE BROMSITE (Patient not taking: Reported on 1/4/2023)      prednisoLONE 1 % Ophthalmic Suspension POST-OP: 1 gtt in surgical eye BID x 1 week then QD x 3 weeks after surgery as directed.  TO REPLACE DUREZOL (Patient not taking:  Reported on 1/4/2023)        Past Medical History:    Deep vein thrombosis (HCC)    Essential hypertension    Glaucoma    High blood pressure    High cholesterol    HYPERTENSION    Pulmonary embolism (HCC)    SCC (squamous cell carcinoma)    Right ear      Past Surgical History:   Procedure Laterality Date    Colonoscopy N/A 7/15/2019    Procedure: COLONOSCOPY;  Surgeon: Yaya Alexander MD;  Location: [facility] ENDOSCOPY      History reviewed. No pertinent family history.  No family hx of arthriits.    Social History:  Social History     Socioeconomic History    Marital status:    Tobacco Use    Smoking status: Never    Smokeless tobacco: Never   Vaping Use    Vaping status: Never Used   Substance and Sexual Activity    Alcohol use: No    Drug use: No   Other Topics Concern    Grew up on a farm No    History of tanning Yes    Outdoor occupation No    Reaction to local anesthetic No    Pt has a pacemaker No    Pt has a defibrillator No           REVIEW OF SYSTEMS:   Review Of Systems:  Fatigue  Constitutional: fever, no change in weight or appetitie  Derm: No rashes, no oral ulcers, no alopecia, no photosensitivity, no psoriasis  HEENT: No dry eyes, no dry mouth, no Raynaud's, no nasal ulcers, no parotid swelling, no neck pain, no jaw pain, no temple pain  Eyes: No visual changes,   CVS: No chest pain, no heart disease  RS: No SOB, no Cough, No Pleurtic pain,   GI: No nausea, no vomiiting, no abominal pain, no hx of ulcer, no gastritis, no heartburn, no dyshpagia, no BRBPR or melena  : no dysuria,   Neuro: mild numbness or tingling, no headache, no hx of seizures,   Psych: no hx of anxiety or depression  ENDO: no hx of thyroid disease, no hx of DM  Joint/Muscluskeltal: see HPI,   All other ROS are negative.     EXAM:   /80 (BP Location: Left arm, Patient Position: Sitting, Cuff Size: large)   Pulse 60   Ht 5' 6\" (1.676 m)   SpO2 96%   BMI 27.44 kg/m²   HEENT: Clear oropharynx, no oral ulcers, EOM  intact, clear sclear, PERRLA, pleasant, no acute distress, no CAD,   No rashes  CVS: RRR, no murmurs  RS: CTAB, no crackles, no rhonchi  ABD: Soft Non tender, no HSM felt, BS positive  Joint exam:   no neck tendnerness, good ROM,   Good grasp in hands - not tender in hands   Not tender in b/l shoulder - able to raise arms easily -   Not tender in hips   Not tender in lower back   Left ankle mild tender   No jaw tenderness  No temple tenderness   EXTREMITIES: no cyanosis, clubbing or edema  NEURO: intact touch, 5/5 ue and le strength    Component      Latest Ref Rng 4/21/2024   Glucose      70 - 99 mg/dL 143 (H)    Sodium      136 - 145 mmol/L 140    Potassium      3.5 - 5.1 mmol/L 3.9    Chloride      98 - 112 mmol/L 111    Carbon Dioxide, Total      21.0 - 32.0 mmol/L 22.0    ANION GAP      0 - 18 mmol/L 7    BUN      9 - 23 mg/dL 22    CREATININE      0.70 - 1.30 mg/dL 1.24    BUN/CREATININE RATIO      10.0 - 20.0  17.7    CALCIUM      8.7 - 10.4 mg/dL 9.4    CALCULATED OSMOLALITY      275 - 295 mOsm/kg 296 (H)    EGFR      >=60 mL/min/1.73m2 59 (L)    SED RATE      0 - 20 mm/Hr 67 (H)    URIC ACID      3.7 - 9.2 mg/dL 6.1    C-REACTIVE PROTEIN      <1.00 mg/dL 2.90 (H)      Component      Latest Ref Rng 4/21/2024   Glucose      70 - 99 mg/dL 143 (H)    Sodium      136 - 145 mmol/L 140    Potassium      3.5 - 5.1 mmol/L 3.9    Chloride      98 - 112 mmol/L 111    Carbon Dioxide, Total      21.0 - 32.0 mmol/L 22.0    ANION GAP      0 - 18 mmol/L 7    BUN      9 - 23 mg/dL 22    CREATININE      0.70 - 1.30 mg/dL 1.24    BUN/CREATININE RATIO      10.0 - 20.0  17.7    CALCIUM      8.7 - 10.4 mg/dL 9.4    CALCULATED OSMOLALITY      275 - 295 mOsm/kg 296 (H)    EGFR      >=60 mL/min/1.73m2 59 (L)    SED RATE      0 - 20 mm/Hr 67 (H)    URIC ACID      3.7 - 9.2 mg/dL 6.1    C-REACTIVE PROTEIN      <1.00 mg/dL 2.90 (H)       Legend:  (H) High  (L) Low  ASSESSMENT AND PLAN:   Lang Kramer is a 78 year old male who  presents for   Chief Complaint   Patient presents with    Arm Pain     Muscles,weakness    Fever     Sudden polyarthralgia, myalgia with elevated sed rate 67mm/hr. ,   - mostly likely this is polymyalgia rheumatica  Info given to patient   - currently 90% better on prednisone 20mg twice a day -   Check labs to evaluate for inflammatory arthritis and/or connective tissue disease   Ok to cont. Pred 40mg a day x 2 weeks, then 30mg ad ay x 2 weeks, then taper -   - dw. Him long term steroids   Rtc in 1 month       Summary:  Check labs   Cont. Prednisone 40mg a day - ok to take it 20mg twice ad ay but if not able to sleep - take two pills in the morning.   Take to 2 weeks, then taper to 30mg a day x 2 weeks.   3.  Return to clinic in 1 month. June 3rd at 12:40 pm     Aylin Mathis MD  5/2/2024  1:44 PM

## 2024-05-03 LAB
CCP IGG SERPL-ACNC: 2 U/ML (ref 0–6.9)
DSDNA IGG SERPL IA-ACNC: 1.8 IU/ML
ENA AB SER QL IA: 0.4 UG/L
ENA AB SER QL IA: NEGATIVE
NUCLEAR IGG TITR SER IF: NEGATIVE {TITER}

## 2024-05-06 LAB
ACE: <15 U/L
ALDOLASE: 6.7 U/L
ANTI-MPO ANTIBODIES: <0.2 UNITS
ANTI-PR3 ANTIBODIES: <0.2 UNITS

## 2024-06-03 ENCOUNTER — OFFICE VISIT (OUTPATIENT)
Dept: RHEUMATOLOGY | Facility: CLINIC | Age: 79
End: 2024-06-03

## 2024-06-03 ENCOUNTER — LAB ENCOUNTER (OUTPATIENT)
Dept: LAB | Facility: HOSPITAL | Age: 79
End: 2024-06-03
Attending: INTERNAL MEDICINE
Payer: MEDICAID

## 2024-06-03 VITALS
RESPIRATION RATE: 16 BRPM | DIASTOLIC BLOOD PRESSURE: 75 MMHG | HEART RATE: 60 BPM | BODY MASS INDEX: 27.34 KG/M2 | WEIGHT: 170.13 LBS | HEIGHT: 66 IN | SYSTOLIC BLOOD PRESSURE: 123 MMHG

## 2024-06-03 DIAGNOSIS — M35.3 POLYMYALGIA RHEUMATICA (HCC): ICD-10-CM

## 2024-06-03 DIAGNOSIS — M35.3 POLYMYALGIA RHEUMATICA (HCC): Primary | ICD-10-CM

## 2024-06-03 DIAGNOSIS — R76.8 RHEUMATOID FACTOR POSITIVE: ICD-10-CM

## 2024-06-03 LAB
CRP SERPL-MCNC: <0.4 MG/DL (ref ?–1)
ERYTHROCYTE [SEDIMENTATION RATE] IN BLOOD: 9 MM/HR

## 2024-06-03 PROCEDURE — 36415 COLL VENOUS BLD VENIPUNCTURE: CPT

## 2024-06-03 PROCEDURE — 86235 NUCLEAR ANTIGEN ANTIBODY: CPT

## 2024-06-03 PROCEDURE — 86140 C-REACTIVE PROTEIN: CPT

## 2024-06-03 PROCEDURE — 85652 RBC SED RATE AUTOMATED: CPT

## 2024-06-03 PROCEDURE — 99214 OFFICE O/P EST MOD 30 MIN: CPT | Performed by: INTERNAL MEDICINE

## 2024-06-03 RX ORDER — PREDNISONE 2.5 MG/1
7.5 TABLET ORAL DAILY
Qty: 90 TABLET | Refills: 2 | Status: SHIPPED | OUTPATIENT
Start: 2024-06-03

## 2024-06-03 NOTE — PATIENT INSTRUCTIONS
Check labs today -   Cont. Prednisone 10mg x 3 weeks, then taper to 7.5mg ad ay . X 1 month, then 5mg ad ay   3.  Return to clinic in 2-3 months. August 23rd at 9:40 am

## 2024-06-03 NOTE — PROGRESS NOTES
Lang Kramer is a 78 year old male who presents for   Chief Complaint   Patient presents with    PMR    Lab Results    Medication Follow-Up   .   HPI:     I had the pleasure of seeing Lang Kramer on 5/2/2024 for evaluation.     He is a pleasant 78 year old with sudden polyarthralgia and fever x 2months, and elevated sed rate 67mm/hr. He was referred by Dr. Devlin.   About 2 months ago he got sick with high fever. He went to the ER but never got seen. He recovered but fever continued low grade 36-38 degrees C and he felt better  but not 100%.  - mild coughing ,and fever. The cough stopped and the low grade fevers continued. He did labs and urine test and that was ok.   Then in a few days after the fevere -  he wasn't able to lift his arms over his head or get up from the bed. His legs started hurting after his arms. He had extreme fatigue.   Was taking ibuprofen and tylenol.   The pain would isamar in the hands and go up his arms.   He went to ER in 4/21/2024 - and was diagnosed with a rheumatologic issues.   He was also started on prednisone 40mg ad ay - it's helping partially by Dr. Paredes. He felt so much better. He was started on 60mg but he's only taking 40mg going 1-2 week.   He has labs on 4/21/2024 - esr is 67mm/hr. , crp is 2.2 mg/dL- covide, flu and rsv negative   The fever is consistent - until he took the prednisone. - happening at night. He has night sweats and stilll has those .   His arms are still weak.     No rashes.   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   Was getting woozy.   No jaw pain.   No numbness or tingling. It's better with the prednisone - he was getting numbness in his hands in the morning.   No neck pain or lumbar psin.     He was weak overall.     He has about 2/10 pain. The pain prior to 10/10.     2 months ago everyone was sick, has grandkids -     6/3/2024  RF is positive 141 - GAIL negative, ccp negative - ANCA 1:20 not high   He started having more side effects on prednisone  Had  some side effects on 30mg and 20mg - high blood pressure, dizzy, chest tightness and palpitations   Now on 10mg a day - doing well on this - no joint pain  Gets headaches in the morning and then it goes away pretty quickly      Wt Readings from Last 2 Encounters:   06/03/24 170 lb 1.6 oz (77.2 kg)   04/21/24 170 lb (77.1 kg)     Body mass index is 27.45 kg/m².      Current Outpatient Medications   Medication Sig Dispense Refill    predniSONE 10 MG Oral Tab Take 40mg x 2 weeks ,  then taper to 30mg a day (Patient taking differently: Take 1 tablet (10 mg total) by mouth daily. Take 40mg x 2 weeks ,  then taper to 30mg a day) 165 tablet 0    aspirin 81 MG Oral Tab EC Take 1 tablet every day by oral route as directed for 30 days.      cholecalciferol 1.25 MG (93676 UT) Oral Cap Take 1 capsule (50,000 Units total) by mouth once a week.      citalopram 20 MG Oral Tab       ergocalciferol 1.25 MG (97819 UT) Oral Cap Take 1 capsule (50,000 Units total) by mouth once a week.      ezetimibe 10 MG Oral Tab Take 1 tablet (10 mg total) by mouth daily.      lisinopril 10 MG Oral Tab TAKE ONE TABLET BY MOUTH EVERY DAY AS DIRECTED      ondansetron 4 MG Oral Tablet Dispersible       pantoprazole 40 MG Oral Tab EC Take 1 tablet (40 mg total) by mouth daily.      tamsulosin 0.4 MG Oral Cap Take 1 capsule (0.4 mg total) by mouth daily.      tobramycin 0.3 % Ophthalmic Solution       triamcinolone 0.1 % External Ointment       amLODIPine Besylate 5 MG Oral Tab Take 1 tablet (5 mg total) by mouth daily.      atorvastatin 20 MG Oral Tab Take 1 tablet (20 mg total) by mouth daily.      aspirin 81 MG Oral Tab Take 1 tablet (81 mg total) by mouth daily.      Dorzolamide HCl-Timolol Mal 22.3-6.8 MG/ML Ophthalmic Solution Place 1 drop into both eyes 3 (three) times daily. 1 Bottle 0    Phenazopyridine HCl 100 MG Oral Tab Take 1 tablet (100 mg total) by mouth 2 (two) times daily as needed. 4 tablet 0    Lisinopril-Hydrochlorothiazide 10-12.5 MG  Oral Tab Take 1 tablet by mouth daily.      latanoprost 0.005 % Ophthalmic Solution Place 1 drop into both eyes nightly.      predniSONE 20 MG Oral Tab Take 2 tablets (40 mg total) by mouth daily. (Patient not taking: Reported on 6/3/2024)      Ketorolac Tromethamine 0.4 % Ophthalmic Solution 1 gtt in surgical eye once daily starting 3 days before surgery and continue after as directed.  TO REPLACE BROMSITE (Patient not taking: Reported on 1/4/2023)      prednisoLONE 1 % Ophthalmic Suspension POST-OP: 1 gtt in surgical eye BID x 1 week then QD x 3 weeks after surgery as directed.  TO REPLACE DUREZOL (Patient not taking: Reported on 1/4/2023)        Past Medical History:    Deep vein thrombosis (HCC)    Essential hypertension    Glaucoma    High blood pressure    High cholesterol    HYPERTENSION    Pulmonary embolism (HCC)    SCC (squamous cell carcinoma)    Right ear      Past Surgical History:   Procedure Laterality Date    Colonoscopy N/A 7/15/2019    Procedure: COLONOSCOPY;  Surgeon: Yaya Alexander MD;  Location: [facility] ENDOSCOPY      History reviewed. No pertinent family history.  No family hx of arthriits.    Social History:  Social History     Socioeconomic History    Marital status:    Tobacco Use    Smoking status: Never    Smokeless tobacco: Never   Vaping Use    Vaping status: Never Used   Substance and Sexual Activity    Alcohol use: No    Drug use: No   Other Topics Concern    Grew up on a farm No    History of tanning Yes    Outdoor occupation No    Reaction to local anesthetic No    Pt has a pacemaker No    Pt has a defibrillator No           REVIEW OF SYSTEMS:   Review Of Systems:  Fatigue  Constitutional: fever, no change in weight or appetitie  Derm: No rashes, no oral ulcers, no alopecia, no photosensitivity, no psoriasis  HEENT: No dry eyes, no dry mouth, no Raynaud's, no nasal ulcers, no parotid swelling, no neck pain, no jaw pain, no temple pain  Eyes: No visual changes,   CVS: No chest  pain, no heart disease  RS: No SOB, no Cough, No Pleurtic pain,   GI: No nausea, no vomiiting, no abominal pain, no hx of ulcer, no gastritis, no heartburn, no dyshpagia, no BRBPR or melena  : no dysuria,   Neuro: mild numbness or tingling, no headache, no hx of seizures,   Psych: no hx of anxiety or depression  ENDO: no hx of thyroid disease, no hx of DM  Joint/Muscluskeltal: see HPI,   All other ROS are negative.     EXAM:   /75   Pulse 60   Resp 16   Ht 5' 6\" (1.676 m)   Wt 170 lb 1.6 oz (77.2 kg)   BMI 27.45 kg/m²   HEENT: Clear oropharynx, no oral ulcers, EOM intact, clear sclear, PERRLA, pleasant, no acute distress, no CAD,   No rashes  CVS: RRR, no murmurs  RS: CTAB, no crackles, no rhonchi  ABD: Soft Non tender, no HSM felt, BS positive  Joint exam:   no neck tendnerness, good ROM,   Good grasp in hands - not tender in hands   Not tender in b/l shoulder - able to raise arms easily -   Not tender in hips   Not tender in lower back   Left ankle mild tender   No jaw tenderness  No temple tenderness   EXTREMITIES: no cyanosis, clubbing or edema  NEURO: intact touch, 5/5 ue and le strength    Component      Latest Ref Rng 4/21/2024   Glucose      70 - 99 mg/dL 143 (H)    Sodium      136 - 145 mmol/L 140    Potassium      3.5 - 5.1 mmol/L 3.9    Chloride      98 - 112 mmol/L 111    Carbon Dioxide, Total      21.0 - 32.0 mmol/L 22.0    ANION GAP      0 - 18 mmol/L 7    BUN      9 - 23 mg/dL 22    CREATININE      0.70 - 1.30 mg/dL 1.24    BUN/CREATININE RATIO      10.0 - 20.0  17.7    CALCIUM      8.7 - 10.4 mg/dL 9.4    CALCULATED OSMOLALITY      275 - 295 mOsm/kg 296 (H)    EGFR      >=60 mL/min/1.73m2 59 (L)    SED RATE      0 - 20 mm/Hr 67 (H)    URIC ACID      3.7 - 9.2 mg/dL 6.1    C-REACTIVE PROTEIN      <1.00 mg/dL 2.90 (H)      Component      Latest Ref Rng 4/21/2024   Glucose      70 - 99 mg/dL 143 (H)    Sodium      136 - 145 mmol/L 140    Potassium      3.5 - 5.1 mmol/L 3.9    Chloride       98 - 112 mmol/L 111    Carbon Dioxide, Total      21.0 - 32.0 mmol/L 22.0    ANION GAP      0 - 18 mmol/L 7    BUN      9 - 23 mg/dL 22    CREATININE      0.70 - 1.30 mg/dL 1.24    BUN/CREATININE RATIO      10.0 - 20.0  17.7    CALCIUM      8.7 - 10.4 mg/dL 9.4    CALCULATED OSMOLALITY      275 - 295 mOsm/kg 296 (H)    EGFR      >=60 mL/min/1.73m2 59 (L)    SED RATE      0 - 20 mm/Hr 67 (H)    URIC ACID      3.7 - 9.2 mg/dL 6.1    C-REACTIVE PROTEIN      <1.00 mg/dL 2.90 (H)       Component      Latest Ref Rng 4/21/2024 5/2/2024   MYELOPEROX ANTIBODIES, IGG      0.0 - 0.9 units  <0.2    SERINE PROTEASE3, IGG      0.0 - 0.9 units  <0.2    Cytoplasmic (C-ANCA)      Neg:<1:20 titer  <1:20    Perinuclear (P-ANCA)      Neg:<1:20 titer  <1:20    ATYPICAL PANCA      Neg:<1:20 titer  1:20 (H)    Expanded VENECIA Antibody Screen, IGG      <0.7 ug/l  0.40    Anti-dsDNA antibody      <10 IU/mL  1.8    Connective Tissue Disease Screen Interpretation      Negative   Negative    URIC ACID      3.7 - 9.2 mg/dL 6.1     C-REACTIVE PROTEIN      <1.00 mg/dL 2.90 (H)     RHEUMATOID FACTOR      <14 IU/mL  141 (H)    C-Citrullinated Peptide IgG AB      0.0 - 6.9 U/mL  2.0    CK      46 - 171 U/L  27 (L)    Aldolase      3.3 - 10.3 U/L  6.7    ACE      14 - 82 U/L  <15    GAIL SCREEN      Negative   Negative       Legend:  (H) High  (L) Low  ASSESSMENT AND PLAN:   Lang Kramer is a 78 year old male who presents for   Chief Complaint   Patient presents with    PMR    Lab Results    Medication Follow-Up     polymyalgia rheumatica versus serpositive RA - symptoms most like PMR - Sudden polyarthralgia, myalgia with elevated sed rate 67mm/hr. ,   - mostly likely this is   Info given to patient   - currently 90% better on prednisone 20mg twice a day - now down to 10mg a day -   Had some side effects on tapering prednisone - high doses 40mg and 30mg - caused high blood pressure, dizzy, chest tightness and palpitations   Now doing better on  prednisone 10mg ad ay  - doing well on this - taking for 1 week - no side effects and no joint pain   Cont. For 3 more weeks, thent aper to 7.5mg ad ay     - dw. Him long term steroids   Rtc in 1 month       Summary:  Check labs today -   Cont. Prednisone 10mg x 3 weeks, then taper to 7.5mg ad ay . X 1 month, then 5mg ad ay   3.  Return to clinic in 2-3 months. August 23rd at 9:40 am     Aylin Mathis MD  6/3/2024   12:36 PM     is applicable because the patient's medical record notes reflects the ongoing nature of the continuous longitudinal relationship of care, and the medical record indicates that there is ongoing treatment of a serious/complex medical condition.

## 2024-06-04 LAB
ENA SS-A IGG SER IA-ACNC: <0.4 U/ML
ENA SS-B IGG SER IA-ACNC: <0.4 U/ML

## 2024-08-23 ENCOUNTER — LAB ENCOUNTER (OUTPATIENT)
Dept: LAB | Facility: HOSPITAL | Age: 79
End: 2024-08-23
Attending: INTERNAL MEDICINE
Payer: MEDICAID

## 2024-08-23 ENCOUNTER — OFFICE VISIT (OUTPATIENT)
Dept: RHEUMATOLOGY | Facility: CLINIC | Age: 79
End: 2024-08-23

## 2024-08-23 VITALS
HEIGHT: 66 IN | DIASTOLIC BLOOD PRESSURE: 80 MMHG | HEART RATE: 57 BPM | BODY MASS INDEX: 27.64 KG/M2 | WEIGHT: 172 LBS | SYSTOLIC BLOOD PRESSURE: 152 MMHG

## 2024-08-23 DIAGNOSIS — M35.3 POLYMYALGIA RHEUMATICA (HCC): Primary | ICD-10-CM

## 2024-08-23 DIAGNOSIS — R76.8 RHEUMATOID FACTOR POSITIVE: ICD-10-CM

## 2024-08-23 DIAGNOSIS — M35.3 POLYMYALGIA RHEUMATICA (HCC): ICD-10-CM

## 2024-08-23 LAB
CRP SERPL-MCNC: <0.4 MG/DL (ref ?–1)
ERYTHROCYTE [SEDIMENTATION RATE] IN BLOOD: 12 MM/HR

## 2024-08-23 PROCEDURE — 36415 COLL VENOUS BLD VENIPUNCTURE: CPT

## 2024-08-23 PROCEDURE — 99214 OFFICE O/P EST MOD 30 MIN: CPT | Performed by: INTERNAL MEDICINE

## 2024-08-23 PROCEDURE — 86140 C-REACTIVE PROTEIN: CPT

## 2024-08-23 PROCEDURE — 85652 RBC SED RATE AUTOMATED: CPT

## 2024-08-23 RX ORDER — PREDNISONE 1 MG/1
4 TABLET ORAL
Qty: 360 TABLET | Refills: 0 | Status: SHIPPED | OUTPATIENT
Start: 2024-08-23

## 2024-08-23 NOTE — PATIENT INSTRUCTIONS
Check labs today -   Cont. Prednisone 4mg a day - x 1 month, then 3mg x 1 month,   3.  Return to clinic in 2 months 10/28 at 12:40 pm

## 2024-08-23 NOTE — PROGRESS NOTES
Lang Kramer is a 79 year old male who presents for   Chief Complaint   Patient presents with    Fibromyalgia Syndrome     Polymyalgia rheumatica   .   HPI:     I had the pleasure of seeing Lang Kramer on 5/2/2024 for evaluation.     He is a pleasant 78 year old with sudden polyarthralgia and fever x 2months, and elevated sed rate 67mm/hr. He was referred by Dr. Devlin.   About 2 months ago he got sick with high fever. He went to the ER but never got seen. He recovered but fever continued low grade 36-38 degrees C and he felt better  but not 100%.  - mild coughing ,and fever. The cough stopped and the low grade fevers continued. He did labs and urine test and that was ok.   Then in a few days after the fevere -  he wasn't able to lift his arms over his head or get up from the bed. His legs started hurting after his arms. He had extreme fatigue.   Was taking ibuprofen and tylenol.   The pain would isamar in the hands and go up his arms.   He went to ER in 4/21/2024 - and was diagnosed with a rheumatologic issues.   He was also started on prednisone 40mg ad ay - it's helping partially by Dr. Paredes. He felt so much better. He was started on 60mg but he's only taking 40mg going 1-2 week.   He has labs on 4/21/2024 - esr is 67mm/hr. , crp is 2.2 mg/dL- covide, flu and rsv negative   The fever is consistent - until he took the prednisone. - happening at night. He has night sweats and stilll has those .   His arms are still weak.     No rashes.   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   Was getting woozy.   No jaw pain.   No numbness or tingling. It's better with the prednisone - he was getting numbness in his hands in the morning.   No neck pain or lumbar psin.     He was weak overall.     He has about 2/10 pain. The pain prior to 10/10.     2 months ago everyone was sick, has grandkids -     6/3/2024  RF is positive 141 - GAIL negative, ccp negative - ANCA 1:20 not high   He started having more side effects on  prednisone  Had some side effects on 30mg and 20mg - high blood pressure, dizzy, chest tightness and palpitations   Now on 10mg a day - doing well on this - no joint pain  Gets headaches in the morning and then it goes away pretty quickly    8/23/2024  He's on prednisone 5mg a day -   He's feeling good.   He tried to stop 1 month ago for 2 days and felt pain in his muscles again -     Minor headaches    Wt Readings from Last 2 Encounters:   08/23/24 172 lb (78 kg)   06/03/24 170 lb 1.6 oz (77.2 kg)     Body mass index is 27.76 kg/m².      Current Outpatient Medications   Medication Sig Dispense Refill    predniSONE 2.5 MG Oral Tab Take 3 tablets (7.5 mg total) by mouth daily. 90 tablet 2    cholecalciferol 1.25 MG (31328 UT) Oral Cap Take 1 capsule (50,000 Units total) by mouth once a week.      ezetimibe 10 MG Oral Tab Take 1 tablet (10 mg total) by mouth daily.      Ketorolac Tromethamine 0.4 % Ophthalmic Solution       lisinopril 10 MG Oral Tab TAKE ONE TABLET BY MOUTH EVERY DAY AS DIRECTED      prednisoLONE 1 % Ophthalmic Suspension       tobramycin 0.3 % Ophthalmic Solution       triamcinolone 0.1 % External Ointment       amLODIPine Besylate 5 MG Oral Tab Take 1 tablet (5 mg total) by mouth daily.      atorvastatin 20 MG Oral Tab Take 1 tablet (20 mg total) by mouth daily.      Dorzolamide HCl-Timolol Mal 22.3-6.8 MG/ML Ophthalmic Solution Place 1 drop into both eyes 3 (three) times daily. 1 Bottle 0    latanoprost 0.005 % Ophthalmic Solution Place 1 drop into both eyes nightly.      predniSONE 20 MG Oral Tab Take 2 tablets (40 mg total) by mouth daily. (Patient not taking: Reported on 6/3/2024)      predniSONE 10 MG Oral Tab Take 40mg x 2 weeks ,  then taper to 30mg a day (Patient not taking: Reported on 8/23/2024) 165 tablet 0    citalopram 20 MG Oral Tab  (Patient not taking: Reported on 8/23/2024)      pantoprazole 40 MG Oral Tab EC Take 1 tablet (40 mg total) by mouth daily. (Patient not taking: Reported  on 8/23/2024)      tamsulosin 0.4 MG Oral Cap Take 1 capsule (0.4 mg total) by mouth daily. (Patient not taking: Reported on 8/23/2024)      Phenazopyridine HCl 100 MG Oral Tab Take 1 tablet (100 mg total) by mouth 2 (two) times daily as needed. (Patient not taking: Reported on 8/23/2024) 4 tablet 0      Past Medical History:    Deep vein thrombosis (HCC)    Essential hypertension    Glaucoma    High blood pressure    High cholesterol    HYPERTENSION    Pulmonary embolism (HCC)    SCC (squamous cell carcinoma)    Right ear      Past Surgical History:   Procedure Laterality Date    Colonoscopy N/A 7/15/2019    Procedure: COLONOSCOPY;  Surgeon: Yaya Alexander MD;  Location: [facility] ENDOSCOPY      No family history on file.  No family hx of arthriits.    Social History:  Social History     Socioeconomic History    Marital status:    Tobacco Use    Smoking status: Never    Smokeless tobacco: Never   Vaping Use    Vaping status: Never Used   Substance and Sexual Activity    Alcohol use: No    Drug use: No   Other Topics Concern    Grew up on a farm No    History of tanning Yes    Outdoor occupation No    Reaction to local anesthetic No    Pt has a pacemaker No    Pt has a defibrillator No           REVIEW OF SYSTEMS:   Review Of Systems:  Fatigue  Constitutional: fever, no change in weight or appetitie  Derm: No rashes, no oral ulcers, no alopecia, no photosensitivity, no psoriasis  HEENT: No dry eyes, no dry mouth, no Raynaud's, no nasal ulcers, no parotid swelling, no neck pain, no jaw pain, no temple pain  Eyes: No visual changes,   CVS: No chest pain, no heart disease  RS: No SOB, no Cough, No Pleurtic pain,   GI: No nausea, no vomiiting, no abominal pain, no hx of ulcer, no gastritis, no heartburn, no dyshpagia, no BRBPR or melena  : no dysuria,   Neuro: mild numbness or tingling, no headache, no hx of seizures,   Psych: no hx of anxiety or depression  ENDO: no hx of thyroid disease, no hx of  DM  Joint/Muscluskeltal: see HPI,   All other ROS are negative.     EXAM:   /80   Pulse 57   Ht 5' 6\" (1.676 m)   Wt 172 lb (78 kg)   BMI 27.76 kg/m²   HEENT: Clear oropharynx, no oral ulcers, EOM intact, clear sclear, PERRLA, pleasant, no acute distress, no CAD,   No rashes  CVS: RRR, no murmurs  RS: CTAB, no crackles, no rhonchi  ABD: Soft Non tender, no HSM felt, BS positive  Joint exam:   no neck tendnerness, good ROM,   Good grasp in hands - not tender in hands   Not tender in b/l shoulder - able to raise arms easily -   Not tender in hips   Not tender in lower back   Left ankle mild tender   No jaw tenderness  No temple tenderness   EXTREMITIES: no cyanosis, clubbing or edema  NEURO: intact touch, 5/5 ue and le strength    Component      Latest Ref Rng 4/21/2024   Glucose      70 - 99 mg/dL 143 (H)    Sodium      136 - 145 mmol/L 140    Potassium      3.5 - 5.1 mmol/L 3.9    Chloride      98 - 112 mmol/L 111    Carbon Dioxide, Total      21.0 - 32.0 mmol/L 22.0    ANION GAP      0 - 18 mmol/L 7    BUN      9 - 23 mg/dL 22    CREATININE      0.70 - 1.30 mg/dL 1.24    BUN/CREATININE RATIO      10.0 - 20.0  17.7    CALCIUM      8.7 - 10.4 mg/dL 9.4    CALCULATED OSMOLALITY      275 - 295 mOsm/kg 296 (H)    EGFR      >=60 mL/min/1.73m2 59 (L)    SED RATE      0 - 20 mm/Hr 67 (H)    URIC ACID      3.7 - 9.2 mg/dL 6.1    C-REACTIVE PROTEIN      <1.00 mg/dL 2.90 (H)      Component      Latest Ref Rng 4/21/2024   Glucose      70 - 99 mg/dL 143 (H)    Sodium      136 - 145 mmol/L 140    Potassium      3.5 - 5.1 mmol/L 3.9    Chloride      98 - 112 mmol/L 111    Carbon Dioxide, Total      21.0 - 32.0 mmol/L 22.0    ANION GAP      0 - 18 mmol/L 7    BUN      9 - 23 mg/dL 22    CREATININE      0.70 - 1.30 mg/dL 1.24    BUN/CREATININE RATIO      10.0 - 20.0  17.7    CALCIUM      8.7 - 10.4 mg/dL 9.4    CALCULATED OSMOLALITY      275 - 295 mOsm/kg 296 (H)    EGFR      >=60 mL/min/1.73m2 59 (L)    SED RATE      0 -  20 mm/Hr 67 (H)    URIC ACID      3.7 - 9.2 mg/dL 6.1    C-REACTIVE PROTEIN      <1.00 mg/dL 2.90 (H)       Component      Latest Ref Rng 4/21/2024 5/2/2024   MYELOPEROX ANTIBODIES, IGG      0.0 - 0.9 units  <0.2    SERINE PROTEASE3, IGG      0.0 - 0.9 units  <0.2    Cytoplasmic (C-ANCA)      Neg:<1:20 titer  <1:20    Perinuclear (P-ANCA)      Neg:<1:20 titer  <1:20    ATYPICAL PANCA      Neg:<1:20 titer  1:20 (H)    Expanded VENECIA Antibody Screen, IGG      <0.7 ug/l  0.40    Anti-dsDNA antibody      <10 IU/mL  1.8    Connective Tissue Disease Screen Interpretation      Negative   Negative    URIC ACID      3.7 - 9.2 mg/dL 6.1     C-REACTIVE PROTEIN      <1.00 mg/dL 2.90 (H)     RHEUMATOID FACTOR      <14 IU/mL  141 (H)    C-Citrullinated Peptide IgG AB      0.0 - 6.9 U/mL  2.0    CK      46 - 171 U/L  27 (L)    Aldolase      3.3 - 10.3 U/L  6.7    ACE      14 - 82 U/L  <15    GAIL SCREEN      Negative   Negative       Component      Latest Ref Rng 6/3/2024   Anti-SSA Antibody, IGG      <7 U/mL <0.4    Anti-SSB Antibody, IGG      <7 U/mL <0.4    SED RATE      0 - 20 mm/Hr 9    C-REACTIVE PROTEIN      <1.00 mg/dL <0.40        ASSESSMENT AND PLAN:   Lang Kramer is a 79 year old male who presents for   Chief Complaint   Patient presents with    Fibromyalgia Syndrome     Polymyalgia rheumatica     polymyalgia rheumatica versus serpositive RA - symptoms most like PMR - Sudden polyarthralgia, myalgia with elevated sed rate 67mm/hr. ,   - mostly likely this is   Info given to patient   - currently 90% better on prednisone 20mg twice a day - now down to 10mg a day - --> now down to 5mg a day -   Had some side effects on tapering prednisone - high doses 40mg and 30mg - caused high blood pressure, dizzy, chest tightness and palpitations   Now doing better on prednisone  5 mg ad ay  - doing well on this - taking for 1 week - no side effects and no joint pain   D/w him to cont. Tapering rpednisoen slowly - try 4mg a day x 1  month, then 3mg x 1 month  Then check labs   Rtc in 2months.        - dw. Him long term steroids   Rtc in 1 month       Summary:  Check labs today -   Cont. Prednisone 4mg a day - x 1 month, then 3mg x 1 month,   3.  Return to clinic in 2 months 10/28 at 12:40 pm     Aylin Mathis MD  8/23/2024   10:07 AM         is applicable because the patient's medical record notes reflects the ongoing nature of the continuous longitudinal relationship of care, and the medical record indicates that there is ongoing treatment of a serious/complex medical condition.

## 2024-10-28 ENCOUNTER — OFFICE VISIT (OUTPATIENT)
Dept: RHEUMATOLOGY | Facility: CLINIC | Age: 79
End: 2024-10-28

## 2024-10-28 ENCOUNTER — LAB ENCOUNTER (OUTPATIENT)
Dept: LAB | Facility: HOSPITAL | Age: 79
End: 2024-10-28
Attending: INTERNAL MEDICINE
Payer: MEDICAID

## 2024-10-28 VITALS
BODY MASS INDEX: 28.28 KG/M2 | HEART RATE: 57 BPM | SYSTOLIC BLOOD PRESSURE: 144 MMHG | HEIGHT: 66 IN | DIASTOLIC BLOOD PRESSURE: 82 MMHG | WEIGHT: 176 LBS | RESPIRATION RATE: 16 BRPM

## 2024-10-28 DIAGNOSIS — R73.03 PREDIABETES: ICD-10-CM

## 2024-10-28 DIAGNOSIS — M35.3 POLYMYALGIA RHEUMATICA (HCC): ICD-10-CM

## 2024-10-28 DIAGNOSIS — R76.8 RHEUMATOID FACTOR POSITIVE: ICD-10-CM

## 2024-10-28 DIAGNOSIS — E55.9 VITAMIN D DEFICIENCY: ICD-10-CM

## 2024-10-28 DIAGNOSIS — M35.3 POLYMYALGIA RHEUMATICA (HCC): Primary | ICD-10-CM

## 2024-10-28 LAB
ALBUMIN SERPL-MCNC: 4.4 G/DL (ref 3.2–4.8)
ALBUMIN/GLOB SERPL: 1.3 {RATIO} (ref 1–2)
ALP LIVER SERPL-CCNC: 75 U/L
ALT SERPL-CCNC: 28 U/L
ANION GAP SERPL CALC-SCNC: 8 MMOL/L (ref 0–18)
AST SERPL-CCNC: 22 U/L (ref ?–34)
BILIRUB SERPL-MCNC: 0.5 MG/DL (ref 0.2–1.1)
BUN BLD-MCNC: 28 MG/DL (ref 9–23)
BUN/CREAT SERPL: 19.3 (ref 10–20)
CALCIUM BLD-MCNC: 10.1 MG/DL (ref 8.7–10.4)
CHLORIDE SERPL-SCNC: 112 MMOL/L (ref 98–112)
CO2 SERPL-SCNC: 26 MMOL/L (ref 21–32)
CREAT BLD-MCNC: 1.45 MG/DL
CRP SERPL-MCNC: <0.4 MG/DL (ref ?–1)
DEPRECATED RDW RBC AUTO: 45.6 FL (ref 35.1–46.3)
EGFRCR SERPLBLD CKD-EPI 2021: 48 ML/MIN/1.73M2 (ref 60–?)
ERYTHROCYTE [DISTWIDTH] IN BLOOD BY AUTOMATED COUNT: 13.2 % (ref 11–15)
ERYTHROCYTE [SEDIMENTATION RATE] IN BLOOD: 18 MM/HR
EST. AVERAGE GLUCOSE BLD GHB EST-MCNC: 128 MG/DL (ref 68–126)
FASTING STATUS PATIENT QL REPORTED: NO
GLOBULIN PLAS-MCNC: 3.4 G/DL (ref 2–3.5)
GLUCOSE BLD-MCNC: 101 MG/DL (ref 70–99)
HBA1C MFR BLD: 6.1 % (ref ?–5.7)
HCT VFR BLD AUTO: 44.6 %
HGB BLD-MCNC: 15.1 G/DL
MCH RBC QN AUTO: 31.9 PG (ref 26–34)
MCHC RBC AUTO-ENTMCNC: 33.9 G/DL (ref 31–37)
MCV RBC AUTO: 94.1 FL
OSMOLALITY SERPL CALC.SUM OF ELEC: 308 MOSM/KG (ref 275–295)
PLATELET # BLD AUTO: 155 10(3)UL (ref 150–450)
POTASSIUM SERPL-SCNC: 4.9 MMOL/L (ref 3.5–5.1)
PROT SERPL-MCNC: 7.8 G/DL (ref 5.7–8.2)
RBC # BLD AUTO: 4.74 X10(6)UL
SODIUM SERPL-SCNC: 146 MMOL/L (ref 136–145)
VIT D+METAB SERPL-MCNC: 86.8 NG/ML (ref 30–100)
WBC # BLD AUTO: 10 X10(3) UL (ref 4–11)

## 2024-10-28 PROCEDURE — 80053 COMPREHEN METABOLIC PANEL: CPT

## 2024-10-28 PROCEDURE — 83036 HEMOGLOBIN GLYCOSYLATED A1C: CPT

## 2024-10-28 PROCEDURE — 86140 C-REACTIVE PROTEIN: CPT

## 2024-10-28 PROCEDURE — 99214 OFFICE O/P EST MOD 30 MIN: CPT | Performed by: INTERNAL MEDICINE

## 2024-10-28 PROCEDURE — 36415 COLL VENOUS BLD VENIPUNCTURE: CPT

## 2024-10-28 PROCEDURE — 85652 RBC SED RATE AUTOMATED: CPT

## 2024-10-28 PROCEDURE — 82306 VITAMIN D 25 HYDROXY: CPT

## 2024-10-28 PROCEDURE — 85027 COMPLETE CBC AUTOMATED: CPT

## 2024-10-28 RX ORDER — PREDNISONE 1 MG/1
2 TABLET ORAL
Qty: 180 TABLET | Refills: 1 | Status: SHIPPED | OUTPATIENT
Start: 2024-10-28

## 2024-10-28 RX ORDER — PREDNISONE 2.5 MG/1
2.5 TABLET ORAL DAILY
Qty: 90 TABLET | Refills: 1 | Status: SHIPPED | OUTPATIENT
Start: 2024-10-28 | End: 2025-01-26

## 2024-10-28 NOTE — PROGRESS NOTES
Lang Kramer is a 79 year old male who presents for   Chief Complaint   Patient presents with    Follow - Up     Polymyalgia rheumatica     Medication Follow-Up   .   HPI:     I had the pleasure of seeing Lang Kramer on 5/2/2024 for evaluation. [place] -     He is a pleasant 78 year old with sudden polyarthralgia and fever x 2months, and elevated sed rate 67mm/hr. He was referred by Dr. Devlin.   About 2 months ago he got sick with high fever. He went to the ER but never got seen. He recovered but fever continued low grade 36-38 degrees C and he felt better  but not 100%.  - mild coughing ,and fever. The cough stopped and the low grade fevers continued. He did labs and urine test and that was ok.   Then in a few days after the fevere -  he wasn't able to lift his arms over his head or get up from the bed. His legs started hurting after his arms. He had extreme fatigue.   Was taking ibuprofen and tylenol.   The pain would isamar in the hands and go up his arms.   He went to ER in 4/21/2024 - and was diagnosed with a rheumatologic issues.   He was also started on prednisone 40mg ad ay - it's helping partially by Dr. Paredes. He felt so much better. He was started on 60mg but he's only taking 40mg going 1-2 week.   He has labs on 4/21/2024 - esr is 67mm/hr. , crp is 2.2 mg/dL- covide, flu and rsv negative   The fever is consistent - until he took the prednisone. - happening at night. He has night sweats and stilll has those .   His arms are still weak.     No rashes.   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   Was getting woozy.   No jaw pain.   No numbness or tingling. It's better with the prednisone - he was getting numbness in his hands in the morning.   No neck pain or lumbar psin.     He was weak overall.     He has about 2/10 pain. The pain prior to 10/10.     2 months ago everyone was sick, has grandkids -     6/3/2024  RF is positive 141 - GAIL negative, ccp negative - ANCA 1:20 not high   He started having more  side effects on prednisone  Had some side effects on 30mg and 20mg - high blood pressure, dizzy, chest tightness and palpitations   Now on 10mg a day - doing well on this - no joint pain  Gets headaches in the morning and then it goes away pretty quickly    8/23/2024  He's on prednisone 5mg a day -   He's feeling good.   He tried to stop 1 month ago for 2 days and felt pain in his muscles again -     Minor headaches    10/28/2024  He tried cutting down the prednisone to 3mg - even 2mg - but not able to tolerate b/c of weakness of his legs and now back on 4.5mg ad ay.   3mg was making him too tired. He was having more pain in his legs again.   He wanted to sit al the time and rest .   He's good at 4.5mg a day - no pain     Wt Readings from Last 2 Encounters:   10/28/24 176 lb (79.8 kg)   08/23/24 172 lb (78 kg)     Body mass index is 28.41 kg/m².      Current Outpatient Medications   Medication Sig Dispense Refill    predniSONE 1 MG Oral Tab Take 4 tablets (4 mg total) by mouth daily with breakfast. (Patient taking differently: Take 2 tablets (2 mg total) by mouth daily with breakfast.) 360 tablet 0    predniSONE 2.5 MG Oral Tab Take 3 tablets (7.5 mg total) by mouth daily. (Patient taking differently: Take 1 tablet (2.5 mg total) by mouth daily.) 90 tablet 2    predniSONE 20 MG Oral Tab Take 2 tablets (40 mg total) by mouth daily. (Patient not taking: Reported on 10/28/2024)      predniSONE 10 MG Oral Tab Take 40mg x 2 weeks ,  then taper to 30mg a day (Patient not taking: Reported on 10/28/2024) 165 tablet 0    cholecalciferol 1.25 MG (69954 UT) Oral Cap Take 1 capsule (50,000 Units total) by mouth once a week.      citalopram 20 MG Oral Tab  (Patient not taking: Reported on 10/28/2024)      ezetimibe 10 MG Oral Tab Take 1 tablet (10 mg total) by mouth daily.      Ketorolac Tromethamine 0.4 % Ophthalmic Solution       lisinopril 10 MG Oral Tab TAKE ONE TABLET BY MOUTH EVERY DAY AS DIRECTED      pantoprazole 40 MG  Oral Tab EC Take 1 tablet (40 mg total) by mouth daily. (Patient not taking: Reported on 10/28/2024)      prednisoLONE 1 % Ophthalmic Suspension       tamsulosin 0.4 MG Oral Cap Take 1 capsule (0.4 mg total) by mouth daily. (Patient not taking: Reported on 10/28/2024)      tobramycin 0.3 % Ophthalmic Solution       triamcinolone 0.1 % External Ointment       amLODIPine Besylate 5 MG Oral Tab Take 1 tablet (5 mg total) by mouth daily.      atorvastatin 20 MG Oral Tab Take 1 tablet (20 mg total) by mouth daily.      Dorzolamide HCl-Timolol Mal 22.3-6.8 MG/ML Ophthalmic Solution Place 1 drop into both eyes 3 (three) times daily. 1 Bottle 0    Phenazopyridine HCl 100 MG Oral Tab Take 1 tablet (100 mg total) by mouth 2 (two) times daily as needed. (Patient not taking: Reported on 10/28/2024) 4 tablet 0    latanoprost 0.005 % Ophthalmic Solution Place 1 drop into both eyes nightly.        Past Medical History:    Deep vein thrombosis (HCC)    Essential hypertension    Glaucoma    High blood pressure    High cholesterol    HYPERTENSION    Pulmonary embolism (HCC)    SCC (squamous cell carcinoma)    Right ear      Past Surgical History:   Procedure Laterality Date    Colonoscopy N/A 7/15/2019    Procedure: COLONOSCOPY;  Surgeon: Yaya Alexander MD;  Location: [facility] ENDOSCOPY      History reviewed. No pertinent family history.  No family hx of arthriits.    Social History:  Social History     Socioeconomic History    Marital status:    Tobacco Use    Smoking status: Never    Smokeless tobacco: Never   Vaping Use    Vaping status: Never Used   Substance and Sexual Activity    Alcohol use: No    Drug use: No   Other Topics Concern    Grew up on a farm No    History of tanning Yes    Outdoor occupation No    Reaction to local anesthetic No    Pt has a pacemaker No    Pt has a defibrillator No           REVIEW OF SYSTEMS:   Review Of Systems:  Fatigue  Constitutional: fever, no change in weight or appetitie  Derm: No  rashes, no oral ulcers, no alopecia, no photosensitivity, no psoriasis  HEENT: No dry eyes, no dry mouth, no Raynaud's, no nasal ulcers, no parotid swelling, no neck pain, no jaw pain, no temple pain  Eyes: No visual changes,   CVS: No chest pain, no heart disease  RS: No SOB, no Cough, No Pleurtic pain,   GI: No nausea, no vomiiting, no abominal pain, no hx of ulcer, no gastritis, no heartburn, no dyshpagia, no BRBPR or melena  : no dysuria,   Neuro: mild numbness or tingling, no headache, no hx of seizures,   Psych: no hx of anxiety or depression  ENDO: no hx of thyroid disease, no hx of DM  Joint/Muscluskeltal: see HPI,   All other ROS are negative.     EXAM:   /82   Pulse 57   Resp 16   Ht 5' 6\" (1.676 m)   Wt 176 lb (79.8 kg)   BMI 28.41 kg/m²   HEENT: Clear oropharynx, no oral ulcers, EOM intact, clear sclear, PERRLA, pleasant, no acute distress, no CAD,   No rashes  CVS: RRR, no murmurs  RS: CTAB, no crackles, no rhonchi  ABD: Soft Non tender,   Joint exam:   no neck tendnerness, good ROM,   Good grasp in hands - not tender in hands   Not tender in b/l shoulder - able to raise arms easily -   Not tender in hips   Not tender in lower back   Left ankle mild tender   No jaw tenderness  No temple tenderness   EXTREMITIES: no cyanosis, clubbing or edema  NEURO: intact touch, 5/5 ue and le strength    Component      Latest Ref Rng 4/21/2024   Glucose      70 - 99 mg/dL 143 (H)    Sodium      136 - 145 mmol/L 140    Potassium      3.5 - 5.1 mmol/L 3.9    Chloride      98 - 112 mmol/L 111    Carbon Dioxide, Total      21.0 - 32.0 mmol/L 22.0    ANION GAP      0 - 18 mmol/L 7    BUN      9 - 23 mg/dL 22    CREATININE      0.70 - 1.30 mg/dL 1.24    BUN/CREATININE RATIO      10.0 - 20.0  17.7    CALCIUM      8.7 - 10.4 mg/dL 9.4    CALCULATED OSMOLALITY      275 - 295 mOsm/kg 296 (H)    EGFR      >=60 mL/min/1.73m2 59 (L)    SED RATE      0 - 20 mm/Hr 67 (H)    URIC ACID      3.7 - 9.2 mg/dL 6.1     C-REACTIVE PROTEIN      <1.00 mg/dL 2.90 (H)      Component      Latest Ref [facility] 4/21/2024   Glucose      70 - 99 mg/dL 143 (H)    Sodium      136 - 145 mmol/L 140    Potassium      3.5 - 5.1 mmol/L 3.9    Chloride      98 - 112 mmol/L 111    Carbon Dioxide, Total      21.0 - 32.0 mmol/L 22.0    ANION GAP      0 - 18 mmol/L 7    BUN      9 - 23 mg/dL 22    CREATININE      0.70 - 1.30 mg/dL 1.24    BUN/CREATININE RATIO      10.0 - 20.0  17.7    CALCIUM      8.7 - 10.4 mg/dL 9.4    CALCULATED OSMOLALITY      275 - 295 mOsm/kg 296 (H)    EGFR      >=60 mL/min/1.73m2 59 (L)    SED RATE      0 - 20 mm/Hr 67 (H)    URIC ACID      3.7 - 9.2 mg/dL 6.1    C-REACTIVE PROTEIN      <1.00 mg/dL 2.90 (H)       Component      Latest Ref [facility] 4/21/2024 5/2/2024   MYELOPEROX ANTIBODIES, IGG      0.0 - 0.9 units  <0.2    SERINE PROTEASE3, IGG      0.0 - 0.9 units  <0.2    Cytoplasmic (C-ANCA)      Neg:<1:20 titer  <1:20    Perinuclear (P-ANCA)      Neg:<1:20 titer  <1:20    ATYPICAL PANCA      Neg:<1:20 titer  1:20 (H)    Expanded VENECIA Antibody Screen, IGG      <0.7 ug/l  0.40    Anti-dsDNA antibody      <10 IU/mL  1.8    Connective Tissue Disease Screen Interpretation      Negative   Negative    URIC ACID      3.7 - 9.2 mg/dL 6.1     C-REACTIVE PROTEIN      <1.00 mg/dL 2.90 (H)     RHEUMATOID FACTOR      <14 IU/mL  141 (H)    C-Citrullinated Peptide IgG AB      0.0 - 6.9 U/mL  2.0    CK      46 - 171 U/L  27 (L)    Aldolase      3.3 - 10.3 U/L  6.7    ACE      14 - 82 U/L  <15    GAIL SCREEN      Negative   Negative       Component      Latest Ref [facility] 6/3/2024   Anti-SSA Antibody, IGG      <7 U/mL <0.4    Anti-SSB Antibody, IGG      <7 U/mL <0.4    SED RATE      0 - 20 mm/Hr 9    C-REACTIVE PROTEIN      <1.00 mg/dL <0.40        ASSESSMENT AND PLAN:   Lang Kramer is a 79 year old male who presents for   Chief Complaint   Patient presents with    Follow - Up     Polymyalgia rheumatica     Medication Follow-Up     polymyalgia  rheumatica versus serpositive RA - symptoms most like PMR - Sudden polyarthralgia, myalgia with elevated sed rate 67mm/hr. ,   - mostly likely this is   Info given to patient   - currently 90% better on prednisone taper 40mg - now down to 4.5mg ad ay   - not able to tolerate pred 3mg a day   - ok to stay on 4.5mg ad ay - for now   D/w him with daughter translating about taking methotrexate 10mg a week and fa 1mg a day   - Discussed with patient risks and benefits, including hepatoxicity risk, teratogenicity -  not to get pregnant on the medication and infections risk.  He prefers to stay on prednisone - since it' sworking well fro him.   Hold on methotrexate or other steroid sparing agent  -   Will try tapering on the next dose -   - dw. Him long term steroids         Summary:  Check labs today -   Cont. Prednisone 4.5 mg a day   3.  Return to clinic in 4 months.     Aylin Mathis MD  10/28/2024   12:24 PM           is applicable because the patient's medical record notes reflects the ongoing nature of the continuous longitudinal relationship of care, and the medical record indicates that there is ongoing treatment of a serious/complex medical condition.

## 2024-11-21 ENCOUNTER — OFFICE VISIT (OUTPATIENT)
Dept: DERMATOLOGY CLINIC | Facility: CLINIC | Age: 79
End: 2024-11-21

## 2024-11-21 DIAGNOSIS — L81.4 LENTIGINES: ICD-10-CM

## 2024-11-21 DIAGNOSIS — D22.9 MULTIPLE BENIGN NEVI: ICD-10-CM

## 2024-11-21 DIAGNOSIS — D18.01 CHERRY ANGIOMA: ICD-10-CM

## 2024-11-21 DIAGNOSIS — L82.1 SEBORRHEIC KERATOSES: Primary | ICD-10-CM

## 2024-11-21 DIAGNOSIS — L57.0 MULTIPLE ACTINIC KERATOSES: ICD-10-CM

## 2024-11-21 DIAGNOSIS — D49.2 NEOPLASM OF UNSPECIFIED BEHAVIOR OF BONE, SOFT TISSUE, AND SKIN: ICD-10-CM

## 2024-11-21 PROCEDURE — 11102 TANGNTL BX SKIN SINGLE LES: CPT | Performed by: STUDENT IN AN ORGANIZED HEALTH CARE EDUCATION/TRAINING PROGRAM

## 2024-11-21 PROCEDURE — 11103 TANGNTL BX SKIN EA SEP/ADDL: CPT | Performed by: STUDENT IN AN ORGANIZED HEALTH CARE EDUCATION/TRAINING PROGRAM

## 2024-11-21 PROCEDURE — 99214 OFFICE O/P EST MOD 30 MIN: CPT | Performed by: STUDENT IN AN ORGANIZED HEALTH CARE EDUCATION/TRAINING PROGRAM

## 2024-11-21 PROCEDURE — 88305 TISSUE EXAM BY PATHOLOGIST: CPT | Performed by: STUDENT IN AN ORGANIZED HEALTH CARE EDUCATION/TRAINING PROGRAM

## 2024-11-21 PROCEDURE — 17004 DESTROY PREMAL LESIONS 15/>: CPT | Performed by: STUDENT IN AN ORGANIZED HEALTH CARE EDUCATION/TRAINING PROGRAM

## 2024-11-21 RX ORDER — LATANOPROSTENE BUNOD 0.24 MG/ML
1 SOLUTION/ DROPS OPHTHALMIC NIGHTLY
COMMUNITY

## 2024-11-21 RX ORDER — NETARSUDIL 0.2 MG/ML
1 SOLUTION/ DROPS OPHTHALMIC; TOPICAL NIGHTLY
COMMUNITY

## 2024-11-21 NOTE — PROGRESS NOTES
November 21, 2024    Established Patient    Referred by:   No referring provider defined for this encounter.     CHIEF COMPLAINT: FBSE    HISTORY OF PRESENT ILLNESS: .    1. Brown spot  Location: Top Rt scalp/  Duration: Few months ago  Signs and symptoms: None  Current treatment: None  Past treatments: None    2. White scabby spot  Location: Left ear/left face cheek  Duration: Many yrs  Signs and symptoms: Itching on/off  Current treatment: None  Past treatments:       DERM HISTORY:  History of skin cancer: Yes-SCC-Left ear helix    FAMILY HISTORY:  History of melanoma: No    PAST MEDICAL HISTORY:  Past Medical History:    Deep vein thrombosis (HCC)    Essential hypertension    Glaucoma    High blood pressure    High cholesterol    HYPERTENSION    Pulmonary embolism (HCC)    SCC (squamous cell carcinoma)    Right ear       REVIEW OF SYSTEMS:  Constitutional: Denies fever, chills, unintentional weight loss.   Skin as per HPI    Medications  Current Outpatient Medications   Medication Sig Dispense Refill    predniSONE 2.5 MG Oral Tab Take 1 tablet (2.5 mg total) by mouth daily. Take with 2mg a day 90 tablet 1    predniSONE 1 MG Oral Tab Take 2 tablets (2 mg total) by mouth daily with breakfast. Take with 2.5mg a day 180 tablet 1    predniSONE 20 MG Oral Tab Take 2 tablets (40 mg total) by mouth daily. (Patient not taking: Reported on 10/28/2024)      predniSONE 10 MG Oral Tab Take 40mg x 2 weeks ,  then taper to 30mg a day (Patient not taking: Reported on 10/28/2024) 165 tablet 0    cholecalciferol 1.25 MG (95813 UT) Oral Cap Take 1 capsule (50,000 Units total) by mouth once a week.      citalopram 20 MG Oral Tab  (Patient not taking: Reported on 10/28/2024)      ezetimibe 10 MG Oral Tab Take 1 tablet (10 mg total) by mouth daily.      Ketorolac Tromethamine 0.4 % Ophthalmic Solution       lisinopril 10 MG Oral Tab TAKE ONE TABLET BY MOUTH EVERY DAY AS DIRECTED      pantoprazole 40 MG Oral Tab EC Take 1 tablet (40 mg  total) by mouth daily. (Patient not taking: Reported on 10/28/2024)      prednisoLONE 1 % Ophthalmic Suspension       tamsulosin 0.4 MG Oral Cap Take 1 capsule (0.4 mg total) by mouth daily. (Patient not taking: Reported on 10/28/2024)      tobramycin 0.3 % Ophthalmic Solution       triamcinolone 0.1 % External Ointment       amLODIPine Besylate 5 MG Oral Tab Take 1 tablet (5 mg total) by mouth daily.      atorvastatin 20 MG Oral Tab Take 1 tablet (20 mg total) by mouth daily.      Dorzolamide HCl-Timolol Mal 22.3-6.8 MG/ML Ophthalmic Solution Place 1 drop into both eyes 3 (three) times daily. 1 Bottle 0    Phenazopyridine HCl 100 MG Oral Tab Take 1 tablet (100 mg total) by mouth 2 (two) times daily as needed. (Patient not taking: Reported on 10/28/2024) 4 tablet 0    latanoprost 0.005 % Ophthalmic Solution Place 1 drop into both eyes nightly.         PHYSICAL EXAM:  Patient declined chaperone   General: awake, alert, no acute distress  Skin: Skin exam was performed today including the following: head and face, scalp, neck, chest (including breasts and axillae), abdomen, back, bilateral upper extremities, bilateral lower extremities, hands, feet, digits, nails. Pertinent findings include:   - Scattered bright red-purple dome-shaped papules on the trunk and extremities   - Scattered light brown stellate macules on sun exposed sites  - Scattered, evenly colored, round brown macules and papules with regular borders on the trunk and extremities  - Numerous scattered skin-colored and brown, waxy, stuck-on papules and plaques on the trunk and extremities  - face with pink gritty papules  - R frontal scalp with pink scaly papule    ASSESSMENT & PLAN:  Pathophysiology of diagnoses discussed with patient.  Therapeutic options reviewed. Risks, benefits, and alternatives discussed with patient. Instructions reviewed at length.    #Lentigines  #Seborrheic keratoses   #Cherry angiomas   - Reassurance provided regarding the  benign nature of these lesions.    #Multiple benign nevi  - Complete skin exam performed today with no outlier lesions identified   - Reassured patient of benign nature of these lesions.   - Recommend daily photoprotection with broad-spectrum sunscreen, avoidance of sun during peak hours, and sun protective clothing.    - Dermoscopy was used for physical examination of pigmented lesions during today's office visit.    #Multiple actinic keratoses  - Discussed premalignant etiology and possibility of transformation to SCC  - Recommended cryotherapy today   - Discussed side effects including redness, swelling, crusting, and discolortion after treatment, wound care with soap/water and vaseline   - Recommend sun protection with spf 30 or higher, sun protective clothing such as wide brimmed hats and long sleeves. Recommend avoiding midday sun (10 am- 3 pm).     - Procedure Note Cryosurgery of pre-malignant lesion(s)  Risks, benefits, alternatives, complications, and personnel required for cryosurgery reviewed with patient. Patient verbalizes understanding and wishes to proceed.   - Cryosurgery performed with Liquid Nitrogen via cryostat spray gun to Actinic Keratosis . 16 lesion(s) treated.   - Patient tolerated well and wound care discussed. Return if lesions fail to fully resolve.    #Neoplasm(s) of uncertain behavior of skin  - Shave biopsy performed today   - Will notify patient with results and arrange for appropriate definitive treatment, if indicated.      Shave of lesion to establish and confirm diagnosis:  Photo taken: Yes    Risks, benefits, alternatives and personnel required for shave biopsy reviewed with patient. Risks discussed include, but not limited to: pain, bleeding, infection, scar, reaction to anesthetic, and recurrence/need for further treatment.  Patient and physician agree as to site(s) to be biopsied. Patient verbalizes understanding and wishes to proceed.     Site(s) prepped with alcohol and  anesthetized with 1% lidocaine with epinephrine.   Shave of lesion(s) performed to the level of the dermis. Specimen(s) from A. R frontal scalp  B. L flank r/o NFsent for pathology to r/o NMSC 50% ALCL and bandaging applied.   Written and verbal wound care instructions provided to patient, understanding verbalized.      Return to clinic: 6 months or sooner if something concerning arises     Pérez Xiao MD

## 2024-11-25 NOTE — PROGRESS NOTES
Patient's daughter (HIPAA confirmed) informed of test results and all DM's recommendations. Voiced understanding. Path routed to [facility], her contact info provdided

## 2025-02-24 ENCOUNTER — OFFICE VISIT (OUTPATIENT)
Dept: RHEUMATOLOGY | Facility: CLINIC | Age: 80
End: 2025-02-24

## 2025-02-24 VITALS
HEART RATE: 57 BPM | DIASTOLIC BLOOD PRESSURE: 86 MMHG | BODY MASS INDEX: 28.53 KG/M2 | HEIGHT: 66 IN | WEIGHT: 177.5 LBS | SYSTOLIC BLOOD PRESSURE: 161 MMHG | RESPIRATION RATE: 16 BRPM

## 2025-02-24 DIAGNOSIS — R76.8 RHEUMATOID FACTOR POSITIVE: ICD-10-CM

## 2025-02-24 DIAGNOSIS — M35.3 POLYMYALGIA RHEUMATICA (HCC): Primary | ICD-10-CM

## 2025-02-24 PROCEDURE — 99214 OFFICE O/P EST MOD 30 MIN: CPT | Performed by: INTERNAL MEDICINE

## 2025-02-24 NOTE — PATIENT INSTRUCTIONS
Check labs tomorrow -   Cont. Prednisone 2.5 mg a day - if labs are normal - can taper down to 2mg a day x 1 onth, then 1mg x 1month, then off   3.  Return to clinic in 3 months. - ok to break up consult slot

## 2025-02-24 NOTE — PROGRESS NOTES
Lang Kramer is a 79 year old male who presents for   Chief Complaint   Patient presents with    PMR    Medication Follow-Up   .   HPI:     I had the pleasure of seeing Lang Kramer on 5/2/2024 for evaluation. [place] -     He is a pleasant 78 year old with sudden polyarthralgia and fever x 2months, and elevated sed rate 67mm/hr. He was referred by Dr. Devlin.   About 2 months ago he got sick with high fever. He went to the ER but never got seen. He recovered but fever continued low grade 36-38 degrees C and he felt better  but not 100%.  - mild coughing ,and fever. The cough stopped and the low grade fevers continued. He did labs and urine test and that was ok.   Then in a few days after the fevere -  he wasn't able to lift his arms over his head or get up from the bed. His legs started hurting after his arms. He had extreme fatigue.   Was taking ibuprofen and tylenol.   The pain would isamar in the hands and go up his arms.   He went to ER in 4/21/2024 - and was diagnosed with a rheumatologic issues.   He was also started on prednisone 40mg ad ay - it's helping partially by Dr. Paredes. He felt so much better. He was started on 60mg but he's only taking 40mg going 1-2 week.   He has labs on 4/21/2024 - esr is 67mm/hr. , crp is 2.2 mg/dL- covide, flu and rsv negative   The fever is consistent - until he took the prednisone. - happening at night. He has night sweats and stilll has those .   His arms are still weak.     No rashes.   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   Was getting woozy.   No jaw pain.   No numbness or tingling. It's better with the prednisone - he was getting numbness in his hands in the morning.   No neck pain or lumbar psin.     He was weak overall.     He has about 2/10 pain. The pain prior to 10/10.     2 months ago everyone was sick, has grandkids -     6/3/2024  RF is positive 141 - GAIL negative, ccp negative - ANCA 1:20 not high   He started having more side effects on prednisone  Had some  side effects on 30mg and 20mg - high blood pressure, dizzy, chest tightness and palpitations   Now on 10mg a day - doing well on this - no joint pain  Gets headaches in the morning and then it goes away pretty quickly    8/23/2024  He's on prednisone 5mg a day -   He's feeling good.   He tried to stop 1 month ago for 2 days and felt pain in his muscles again -     Minor headaches    10/28/2024  He tried cutting down the prednisone to 3mg - even 2mg - but not able to tolerate b/c of weakness of his legs and now back on 4.5mg ad ay.   3mg was making him too tired. He was having more pain in his legs again.   He wanted to sit al the time and rest .   He's good at 4.5mg a day - no pain     2/24/2025  He's on pred 3.5 prior to the new year and now on 2.5mg a day -   It's working for him -   No issues with leg pain -   He can do 5 squats in his legs in the morning. He has troublw ith the 6th time.     Wt Readings from Last 2 Encounters:   02/24/25 177 lb 8 oz (80.5 kg)   10/28/24 176 lb (79.8 kg)     Body mass index is 28.65 kg/m².      Current Outpatient Medications   Medication Sig Dispense Refill    VYZULTA 0.024 % Ophthalmic Solution Place 1 drop into both eyes nightly.      RHOPRESSA 0.02 % Ophthalmic Solution Place 1 drop into the left eye nightly.      predniSONE 1 MG Oral Tab Take 2 tablets (2 mg total) by mouth daily with breakfast. Take with 2.5mg a day (Patient taking differently: Take 2.5 tablets (2.5 mg total) by mouth daily with breakfast. Take with 2.5mg a day) 180 tablet 1    cholecalciferol 1.25 MG (26964 UT) Oral Cap Take 1 capsule (50,000 Units total) by mouth once a week.      ezetimibe 10 MG Oral Tab Take 1 tablet (10 mg total) by mouth daily.      Ketorolac Tromethamine 0.4 % Ophthalmic Solution       lisinopril 10 MG Oral Tab TAKE ONE TABLET BY MOUTH EVERY DAY AS DIRECTED      prednisoLONE 1 % Ophthalmic Suspension       tobramycin 0.3 % Ophthalmic Solution       amLODIPine Besylate 5 MG Oral Tab  Take 1 tablet (5 mg total) by mouth daily.      atorvastatin 20 MG Oral Tab Take 1 tablet (20 mg total) by mouth daily.      Dorzolamide HCl-Timolol Mal 22.3-6.8 MG/ML Ophthalmic Solution Place 1 drop into both eyes 3 (three) times daily. 1 Bottle 0    latanoprost 0.005 % Ophthalmic Solution Place 1 drop into both eyes nightly.      predniSONE 20 MG Oral Tab Take 2 tablets (40 mg total) by mouth daily. (Patient not taking: Reported on 2/24/2025)      predniSONE 10 MG Oral Tab Take 40mg x 2 weeks ,  then taper to 30mg a day (Patient not taking: Reported on 2/24/2025) 165 tablet 0    citalopram 20 MG Oral Tab  (Patient not taking: Reported on 2/24/2025)      pantoprazole 40 MG Oral Tab EC Take 1 tablet (40 mg total) by mouth daily. (Patient not taking: Reported on 2/24/2025)      tamsulosin 0.4 MG Oral Cap Take 1 capsule (0.4 mg total) by mouth daily. (Patient not taking: Reported on 2/24/2025)      triamcinolone 0.1 % External Ointment  (Patient not taking: Reported on 2/24/2025)      Phenazopyridine HCl 100 MG Oral Tab Take 1 tablet (100 mg total) by mouth 2 (two) times daily as needed. (Patient not taking: Reported on 2/24/2025) 4 tablet 0      Past Medical History:    Deep vein thrombosis (HCC)    Essential hypertension    Glaucoma    High blood pressure    High cholesterol    HYPERTENSION    Pulmonary embolism (HCC)    SCC (squamous cell carcinoma)    Right ear    SCC (squamous cell carcinoma)    Right frontal scalp      Past Surgical History:   Procedure Laterality Date    Colonoscopy N/A 7/15/2019    Procedure: COLONOSCOPY;  Surgeon: Yaya Alexander MD;  Location: [facility]      History reviewed. No pertinent family history.  No family hx of arthriits.    Social History:  Social History     Socioeconomic History    Marital status:    Tobacco Use    Smoking status: Never     Passive exposure: Never    Smokeless tobacco: Never   Vaping Use    Vaping status: Never Used   Substance and Sexual Activity     Alcohol use: No    Drug use: No   Other Topics Concern    Grew up on a farm No    History of tanning Yes    Outdoor occupation No    Reaction to local anesthetic No    Pt has a pacemaker No    Pt has a defibrillator No           REVIEW OF SYSTEMS:   Review Of Systems:  Fatigue  Constitutional: fever, no change in weight or appetitie  Derm: No rashes, no oral ulcers, no alopecia, no photosensitivity, no psoriasis  HEENT: No dry eyes, no dry mouth, no Raynaud's, no nasal ulcers, no parotid swelling, no neck pain, no jaw pain, no temple pain  Eyes: No visual changes,   CVS: No chest pain, no heart disease  RS: No SOB, no Cough, No Pleurtic pain,   GI: No nausea, no vomiiting, no abominal pain, no hx of ulcer, no gastritis, no heartburn, no dyshpagia, no BRBPR or melena  : no dysuria,   Neuro: mild numbness or tingling, no headache, no hx of seizures,   Psych: no hx of anxiety or depression  ENDO: no hx of thyroid disease, no hx of DM  Joint/Muscluskeltal: see HPI,   All other ROS are negative.     EXAM:   BP (!) 161/86   Pulse 57   Resp 16   Ht 5' 6\" (1.676 m)   Wt 177 lb 8 oz (80.5 kg)   BMI 28.65 kg/m²   HEENT: Clear oropharynx, no oral ulcers, EOM intact, clear sclear, PERRLA, pleasant, no acute distress, no CAD,   No rashes  CVS: RRR, no murmurs  RS: CTAB, no crackles, no rhonchi  ABD: Soft Non tender,   Joint exam:   no neck tendnerness, good ROM,   Good grasp in hands - not tender in hands   Not tender in b/l shoulder - able to raise arms easily -   Not tender in hips   Not tender in lower back   Left ankle mild tender   No jaw tenderness  No temple tenderness   EXTREMITIES: no cyanosis, clubbing or edema  NEURO: intact touch, 5/5 ue and le strength    Component      Latest Ref Rng 4/21/2024   Glucose      70 - 99 mg/dL 143 (H)    Sodium      136 - 145 mmol/L 140    Potassium      3.5 - 5.1 mmol/L 3.9    Chloride      98 - 112 mmol/L 111    Carbon Dioxide, Total      21.0 - 32.0 mmol/L 22.0    ANION  GAP      0 - 18 mmol/L 7    BUN      9 - 23 mg/dL 22    CREATININE      0.70 - 1.30 mg/dL 1.24    BUN/CREATININE RATIO      10.0 - 20.0  17.7    CALCIUM      8.7 - 10.4 mg/dL 9.4    CALCULATED OSMOLALITY      275 - 295 mOsm/kg 296 (H)    EGFR      >=60 mL/min/1.73m2 59 (L)    SED RATE      0 - 20 mm/Hr 67 (H)    URIC ACID      3.7 - 9.2 mg/dL 6.1    C-REACTIVE PROTEIN      <1.00 mg/dL 2.90 (H)      Component      Latest Ref Rn 4/21/2024   Glucose      70 - 99 mg/dL 143 (H)    Sodium      136 - 145 mmol/L 140    Potassium      3.5 - 5.1 mmol/L 3.9    Chloride      98 - 112 mmol/L 111    Carbon Dioxide, Total      21.0 - 32.0 mmol/L 22.0    ANION GAP      0 - 18 mmol/L 7    BUN      9 - 23 mg/dL 22    CREATININE      0.70 - 1.30 mg/dL 1.24    BUN/CREATININE RATIO      10.0 - 20.0  17.7    CALCIUM      8.7 - 10.4 mg/dL 9.4    CALCULATED OSMOLALITY      275 - 295 mOsm/kg 296 (H)    EGFR      >=60 mL/min/1.73m2 59 (L)    SED RATE      0 - 20 mm/Hr 67 (H)    URIC ACID      3.7 - 9.2 mg/dL 6.1    C-REACTIVE PROTEIN      <1.00 mg/dL 2.90 (H)       Component      Latest Ref Rn 4/21/2024 5/2/2024   MYELOPEROX ANTIBODIES, IGG      0.0 - 0.9 units  <0.2    SERINE PROTEASE3, IGG      0.0 - 0.9 units  <0.2    Cytoplasmic (C-ANCA)      Neg:<1:20 titer  <1:20    Perinuclear (P-ANCA)      Neg:<1:20 titer  <1:20    ATYPICAL PANCA      Neg:<1:20 titer  1:20 (H)    Expanded VENECIA Antibody Screen, IGG      <0.7 ug/l  0.40    Anti-dsDNA antibody      <10 IU/mL  1.8    Connective Tissue Disease Screen Interpretation      Negative   Negative    URIC ACID      3.7 - 9.2 mg/dL 6.1     C-REACTIVE PROTEIN      <1.00 mg/dL 2.90 (H)     RHEUMATOID FACTOR      <14 IU/mL  141 (H)    C-Citrullinated Peptide IgG AB      0.0 - 6.9 U/mL  2.0    CK      46 - 171 U/L  27 (L)    Aldolase      3.3 - 10.3 U/L  6.7    ACE      14 - 82 U/L  <15    GAIL SCREEN      Negative   Negative       Component      Latest Ref [facility] 6/3/2024   Anti-SSA Antibody, IGG       <7 U/mL <0.4    Anti-SSB Antibody, IGG      <7 U/mL <0.4    SED RATE      0 - 20 mm/Hr 9    C-REACTIVE PROTEIN      <1.00 mg/dL <0.40      Component      Latest Ref Rn 10/28/2024   Glucose      70 - 99 mg/dL 101 (H)    Sodium      136 - 145 mmol/L 146 (H)    Potassium      3.5 - 5.1 mmol/L 4.9    Chloride      98 - 112 mmol/L 112    Carbon Dioxide, Total      21.0 - 32.0 mmol/L 26.0    ANION GAP      0 - 18 mmol/L 8    BUN      9 - 23 mg/dL 28 (H)    CREATININE      0.70 - 1.30 mg/dL 1.45 (H)    BUN/CREATININE RATIO      10.0 - 20.0  19.3    CALCIUM      8.7 - 10.4 mg/dL 10.1    CALCULATED OSMOLALITY      275 - 295 mOsm/kg 308 (H)    EGFR      >=60 mL/min/1.73m2 48 (L)    ALT (SGPT)      10 - 49 U/L 28    AST (SGOT)      <34 U/L 22    ALKALINE PHOSPHATASE      45 - 117 U/L 75    Total Bilirubin      0.2 - 1.1 mg/dL 0.5    PROTEIN, TOTAL      5.7 - 8.2 g/dL 7.8    Albumin      3.2 - 4.8 g/dL 4.4    Globulin      2.0 - 3.5 g/dL 3.4    A/G Ratio      1.0 - 2.0  1.3    Patient Fasting for CMP? No    WBC      4.0 - 11.0 x10(3) uL 10.0    RBC      3.80 - 5.80 x10(6)uL 4.74    Hemoglobin      13.0 - 17.5 g/dL 15.1    Hematocrit      39.0 - 53.0 % 44.6    MCV      80.0 - 100.0 fL 94.1    MCH      26.0 - 34.0 pg 31.9    MCHC      31.0 - 37.0 g/dL 33.9    RDW      11.0 - 15.0 % 13.2    RDW-SD      35.1 - 46.3 fL 45.6    Platelet Count      150.0 - 450.0 10(3)uL 155.0    HEMOGLOBIN A1c      <5.7 % 6.1 (H)    ESTIMATED AVERAGE GLUCOSE      68 - 126 mg/dL 128 (H)    C-REACTIVE PROTEIN      <1.00 mg/dL <0.40    SED RATE      0 - 20 mm/Hr 18    VITAMIN D, 25-OH, TOTAL      30.0 - 100.0 ng/mL 86.8       Legend:  (H) High  (L) Low    ASSESSMENT AND PLAN:   Lang Kramer is a 79 year old male who presents for   Chief Complaint   Patient presents with    PMR    Medication Follow-Up     polymyalgia rheumatica versus serpositive RA - symptoms most like PMR - Sudden polyarthralgia, myalgia with elevated sed rate 67mm/hr. ,   - mostly  likely this is   Info given to patient   - currently 90% better on prednisone taper 40mg - now down to 4.5mg ad ay   - not able to tolerate pred 3mg a day   - ok to stay on 2.5mg ad ay - for now   D/w him with daughter translating about taking methotrexate 10mg a week and fa 1mg a day   - Discussed with patient risks and benefits, including hepatoxicity risk, teratogenicity -  not to get pregnant on the medication and infections risk.  He prefers to stay on prednisone - since it' sworking well fro him.   Hold on methotrexate or other steroid sparing agent  -   Will try tapering on the next dose -   - dw. Him long term steroids         Summary:  Check labs tomorrow -   Cont. Prednisone 2.5 mg a day - if labs are normal - can taper down to 2mg a day x 1 onth, then 1mg x 1month, then off   3.  Return to clinic in 3 months. - ok to break up consult linn Mathis MD  2/24/2025   4:04 PM       is applicable because the patient's medical record notes reflects the ongoing nature of the continuous longitudinal relationship of care, and the medical record indicates that there is ongoing treatment of a serious/complex medical condition.

## 2025-03-13 NOTE — TELEPHONE ENCOUNTER
See message below and advise    Detail Level: Detailed Depth Of Biopsy: dermis Was A Bandage Applied: Yes Size Of Lesion In Cm: 0 Biopsy Type: H and E Biopsy Method: Dermablade Anesthesia Type: 1% lidocaine with epinephrine Anesthesia Volume In Cc: 1 Hemostasis: Drysol Wound Care: Petrolatum Dressing: bandage Destruction After The Procedure: No Type Of Destruction Used: Curettage Curettage Text: The wound bed was treated with curettage after the biopsy was performed. Cryotherapy Text: The wound bed was treated with cryotherapy after the biopsy was performed. Electrodesiccation Text: The wound bed was treated with electrodesiccation after the biopsy was performed. Electrodesiccation And Curettage Text: The wound bed was treated with electrodesiccation and curettage after the biopsy was performed. Silver Nitrate Text: The wound bed was treated with silver nitrate after the biopsy was performed. Lab: 6 Lab Facility: 3 Medical Necessity Information: It is in your best interest to select a reason for this procedure from the list below. All of these items fulfill various CMS LCD requirements except the new and changing color options. Consent: Written consent was obtained and risks were reviewed including but not limited to scarring, infection, bleeding, scabbing, incomplete removal, nerve damage and allergy to anesthesia. Post-Care Instructions: I reviewed with the patient in detail post-care instructions. Patient is to keep the biopsy site dry overnight, and then apply aquaphor twice daily until healed. Notification Instructions: Patient will be notified of biopsy results. However, patient instructed to call the office if not contacted within 2 weeks. Billing Type: Third-Party Bill Information: Selecting Yes will display possible errors in your note based on the variables you have selected. This validation is only offered as a suggestion for you. PLEASE NOTE THAT THE VALIDATION TEXT WILL BE REMOVED WHEN YOU FINALIZE YOUR NOTE. IF YOU WANT TO FAX A PRELIMINARY NOTE YOU WILL NEED TO TOGGLE THIS TO 'NO' IF YOU DO NOT WANT IT IN YOUR FAXED NOTE.

## 2025-04-09 ENCOUNTER — HOSPITAL ENCOUNTER (OUTPATIENT)
Facility: HOSPITAL | Age: 80
Setting detail: OBSERVATION
LOS: 1 days | Discharge: HOME | End: 2025-04-11
Attending: EMERGENCY MEDICINE | Admitting: INTERNAL MEDICINE
Payer: MEDICAID

## 2025-04-09 DIAGNOSIS — R00.1 SINUS BRADYCARDIA: ICD-10-CM

## 2025-04-09 DIAGNOSIS — R55 SYNCOPE AND COLLAPSE: Primary | ICD-10-CM

## 2025-04-09 PROCEDURE — 36415 COLL VENOUS BLD VENIPUNCTURE: CPT

## 2025-04-09 PROCEDURE — 93005 ELECTROCARDIOGRAM TRACING: CPT

## 2025-04-09 PROCEDURE — 93010 ELECTROCARDIOGRAM REPORT: CPT

## 2025-04-09 PROCEDURE — 99285 EMERGENCY DEPT VISIT HI MDM: CPT

## 2025-04-10 ENCOUNTER — APPOINTMENT (OUTPATIENT)
Dept: CT IMAGING | Facility: HOSPITAL | Age: 80
End: 2025-04-10
Attending: EMERGENCY MEDICINE
Payer: MEDICAID

## 2025-04-10 ENCOUNTER — APPOINTMENT (OUTPATIENT)
Dept: NUCLEAR MEDICINE | Facility: HOSPITAL | Age: 80
End: 2025-04-10
Attending: INTERNAL MEDICINE
Payer: MEDICAID

## 2025-04-10 ENCOUNTER — APPOINTMENT (OUTPATIENT)
Dept: CV DIAGNOSTICS | Facility: HOSPITAL | Age: 80
End: 2025-04-10
Attending: INTERNAL MEDICINE
Payer: MEDICAID

## 2025-04-10 PROBLEM — R00.1 SINUS BRADYCARDIA: Status: ACTIVE | Noted: 2025-04-10

## 2025-04-10 PROBLEM — R55 SYNCOPE AND COLLAPSE: Status: ACTIVE | Noted: 2025-04-10

## 2025-04-10 LAB
% OF MAX PREDICTED HR: 100 %
ALBUMIN SERPL-MCNC: 4 G/DL (ref 3.2–4.8)
ALP LIVER SERPL-CCNC: 80 U/L (ref 45–117)
ALT SERPL-CCNC: 74 U/L (ref 10–49)
ANION GAP SERPL CALC-SCNC: 9 MMOL/L (ref 0–18)
APTT PPP: 23.2 SECONDS (ref 23–36)
AST SERPL-CCNC: 41 U/L (ref ?–34)
ATRIAL RATE: 48 BPM
BASOPHILS # BLD AUTO: 0.05 X10(3) UL (ref 0–0.2)
BASOPHILS NFR BLD AUTO: 0.5 %
BILIRUB DIRECT SERPL-MCNC: 0.2 MG/DL (ref ?–0.3)
BILIRUB SERPL-MCNC: 0.6 MG/DL (ref 0.2–1.1)
BUN BLD-MCNC: 21 MG/DL (ref 9–23)
BUN/CREAT SERPL: 13.9 (ref 10–20)
CALCIUM BLD-MCNC: 9.3 MG/DL (ref 8.7–10.4)
CHLORIDE SERPL-SCNC: 108 MMOL/L (ref 98–112)
CO2 SERPL-SCNC: 22 MMOL/L (ref 21–32)
CREAT BLD-MCNC: 1.51 MG/DL (ref 0.7–1.3)
DEPRECATED RDW RBC AUTO: 44.7 FL (ref 35.1–46.3)
EGFRCR SERPLBLD CKD-EPI 2021: 46 ML/MIN/1.73M2 (ref 60–?)
EOSINOPHIL # BLD AUTO: 0.19 X10(3) UL (ref 0–0.7)
EOSINOPHIL NFR BLD AUTO: 1.9 %
ERYTHROCYTE [DISTWIDTH] IN BLOOD BY AUTOMATED COUNT: 13.6 % (ref 11–15)
GLUCOSE BLD-MCNC: 134 MG/DL (ref 70–99)
HCT VFR BLD AUTO: 50.8 % (ref 39–53)
HGB BLD-MCNC: 17 G/DL (ref 13–17.5)
IMM GRANULOCYTES # BLD AUTO: 0.08 X10(3) UL (ref 0–1)
IMM GRANULOCYTES NFR BLD: 0.8 %
INR BLD: 0.97 (ref 0.8–1.2)
LYMPHOCYTES # BLD AUTO: 2.11 X10(3) UL (ref 1–4)
LYMPHOCYTES NFR BLD AUTO: 20.7 %
MAX DIASTOLIC BP: 70 MMHG
MAX HEART RATE: 82 BPM
MAX PREDICTED HEART RATE: 139 BPM
MAX SYSTOLIC BP: 134 MMHG
MAX WORK LOAD: 10
MCH RBC QN AUTO: 30.1 PG (ref 26–34)
MCHC RBC AUTO-ENTMCNC: 33.5 G/DL (ref 31–37)
MCV RBC AUTO: 90.1 FL (ref 80–100)
MONOCYTES # BLD AUTO: 1.28 X10(3) UL (ref 0.1–1)
MONOCYTES NFR BLD AUTO: 12.6 %
NEUTROPHILS # BLD AUTO: 6.46 X10 (3) UL (ref 1.5–7.7)
NEUTROPHILS # BLD AUTO: 6.46 X10(3) UL (ref 1.5–7.7)
NEUTROPHILS NFR BLD AUTO: 63.5 %
NT-PROBNP SERPL-MCNC: 214 PG/ML (ref ?–450)
OSMOLALITY SERPL CALC.SUM OF ELEC: 293 MOSM/KG (ref 275–295)
P AXIS: 39 DEGREES
P-R INTERVAL: 138 MS
PLATELET # BLD AUTO: 137 10(3)UL (ref 150–450)
PLATELETS.RETICULATED NFR BLD AUTO: 4.9 % (ref 0–7)
POTASSIUM SERPL-SCNC: 4.1 MMOL/L (ref 3.5–5.1)
PROT SERPL-MCNC: 7.6 G/DL (ref 5.7–8.2)
PROTHROMBIN TIME: 13.5 SECONDS (ref 11.6–14.8)
Q-T INTERVAL: 528 MS
QRS DURATION: 90 MS
QTC CALCULATION (BEZET): 471 MS
R AXIS: 0 DEGREES
RBC # BLD AUTO: 5.64 X10(6)UL (ref 3.8–5.8)
SODIUM SERPL-SCNC: 139 MMOL/L (ref 136–145)
T AXIS: 122 DEGREES
TROPONIN I SERPL HS-MCNC: 10 NG/L (ref ?–53)
TROPONIN I SERPL HS-MCNC: 8 NG/L (ref ?–53)
TROPONIN I SERPL HS-MCNC: 9 NG/L (ref ?–53)
VENTRICULAR RATE: 48 BPM
WBC # BLD AUTO: 10.2 X10(3) UL (ref 4–11)

## 2025-04-10 PROCEDURE — 94760 N-INVAS EAR/PLS OXIMETRY 1: CPT

## 2025-04-10 PROCEDURE — 78452 HT MUSCLE IMAGE SPECT MULT: CPT | Performed by: INTERNAL MEDICINE

## 2025-04-10 PROCEDURE — 84484 ASSAY OF TROPONIN QUANT: CPT | Performed by: EMERGENCY MEDICINE

## 2025-04-10 PROCEDURE — 93018 CV STRESS TEST I&R ONLY: CPT | Performed by: INTERNAL MEDICINE

## 2025-04-10 PROCEDURE — 84484 ASSAY OF TROPONIN QUANT: CPT | Performed by: INTERNAL MEDICINE

## 2025-04-10 PROCEDURE — 70450 CT HEAD/BRAIN W/O DYE: CPT | Performed by: EMERGENCY MEDICINE

## 2025-04-10 PROCEDURE — 93306 TTE W/DOPPLER COMPLETE: CPT | Performed by: INTERNAL MEDICINE

## 2025-04-10 PROCEDURE — 80076 HEPATIC FUNCTION PANEL: CPT | Performed by: EMERGENCY MEDICINE

## 2025-04-10 PROCEDURE — 85610 PROTHROMBIN TIME: CPT | Performed by: EMERGENCY MEDICINE

## 2025-04-10 PROCEDURE — 85730 THROMBOPLASTIN TIME PARTIAL: CPT | Performed by: EMERGENCY MEDICINE

## 2025-04-10 PROCEDURE — 93017 CV STRESS TEST TRACING ONLY: CPT | Performed by: INTERNAL MEDICINE

## 2025-04-10 PROCEDURE — 80048 BASIC METABOLIC PNL TOTAL CA: CPT | Performed by: EMERGENCY MEDICINE

## 2025-04-10 PROCEDURE — 83880 ASSAY OF NATRIURETIC PEPTIDE: CPT | Performed by: EMERGENCY MEDICINE

## 2025-04-10 PROCEDURE — 93016 CV STRESS TEST SUPVJ ONLY: CPT | Performed by: INTERNAL MEDICINE

## 2025-04-10 PROCEDURE — 71260 CT THORAX DX C+: CPT | Performed by: EMERGENCY MEDICINE

## 2025-04-10 PROCEDURE — 85025 COMPLETE CBC W/AUTO DIFF WBC: CPT | Performed by: EMERGENCY MEDICINE

## 2025-04-10 RX ORDER — POLYETHYLENE GLYCOL 3350 17 G/17G
17 POWDER, FOR SOLUTION ORAL DAILY PRN
Status: DISCONTINUED | OUTPATIENT
Start: 2025-04-10 | End: 2025-04-11

## 2025-04-10 RX ORDER — LISINOPRIL 10 MG/1
10 TABLET ORAL DAILY
Status: DISCONTINUED | OUTPATIENT
Start: 2025-04-10 | End: 2025-04-11

## 2025-04-10 RX ORDER — SODIUM PHOSPHATE, DIBASIC AND SODIUM PHOSPHATE, MONOBASIC 7; 19 G/230ML; G/230ML
1 ENEMA RECTAL ONCE AS NEEDED
Status: DISCONTINUED | OUTPATIENT
Start: 2025-04-10 | End: 2025-04-11

## 2025-04-10 RX ORDER — ENOXAPARIN SODIUM 100 MG/ML
40 INJECTION SUBCUTANEOUS DAILY
Status: DISCONTINUED | OUTPATIENT
Start: 2025-04-10 | End: 2025-04-11

## 2025-04-10 RX ORDER — LATANOPROST 50 UG/ML
1 SOLUTION/ DROPS OPHTHALMIC NIGHTLY
Status: DISCONTINUED | OUTPATIENT
Start: 2025-04-10 | End: 2025-04-11

## 2025-04-10 RX ORDER — EZETIMIBE 10 MG/1
10 TABLET ORAL DAILY
Status: DISCONTINUED | OUTPATIENT
Start: 2025-04-10 | End: 2025-04-11

## 2025-04-10 RX ORDER — ATORVASTATIN CALCIUM 20 MG/1
20 TABLET, FILM COATED ORAL DAILY
Status: DISCONTINUED | OUTPATIENT
Start: 2025-04-10 | End: 2025-04-11

## 2025-04-10 RX ORDER — SENNOSIDES 8.6 MG
17.2 TABLET ORAL NIGHTLY PRN
Status: DISCONTINUED | OUTPATIENT
Start: 2025-04-10 | End: 2025-04-11

## 2025-04-10 RX ORDER — ONDANSETRON 2 MG/ML
4 INJECTION INTRAMUSCULAR; INTRAVENOUS EVERY 6 HOURS PRN
Status: DISCONTINUED | OUTPATIENT
Start: 2025-04-10 | End: 2025-04-11

## 2025-04-10 RX ORDER — DORZOLAMIDE HYDROCHLORIDE AND TIMOLOL MALEATE 20; 5 MG/ML; MG/ML
1 SOLUTION/ DROPS OPHTHALMIC 3 TIMES DAILY
Status: DISCONTINUED | OUTPATIENT
Start: 2025-04-10 | End: 2025-04-11

## 2025-04-10 RX ORDER — PREDNISONE 2.5 MG/1
2.5 TABLET ORAL
Status: DISCONTINUED | OUTPATIENT
Start: 2025-04-10 | End: 2025-04-11

## 2025-04-10 RX ORDER — AMLODIPINE BESYLATE 5 MG/1
5 TABLET ORAL DAILY
Status: DISCONTINUED | OUTPATIENT
Start: 2025-04-10 | End: 2025-04-11

## 2025-04-10 RX ORDER — ACETAMINOPHEN 500 MG
500 TABLET ORAL EVERY 4 HOURS PRN
Status: DISCONTINUED | OUTPATIENT
Start: 2025-04-10 | End: 2025-04-11

## 2025-04-10 RX ORDER — BISACODYL 10 MG
10 SUPPOSITORY, RECTAL RECTAL
Status: DISCONTINUED | OUTPATIENT
Start: 2025-04-10 | End: 2025-04-11

## 2025-04-10 RX ORDER — METOCLOPRAMIDE HYDROCHLORIDE 5 MG/ML
5 INJECTION INTRAMUSCULAR; INTRAVENOUS EVERY 8 HOURS PRN
Status: DISCONTINUED | OUTPATIENT
Start: 2025-04-10 | End: 2025-04-11

## 2025-04-10 RX ORDER — REGADENOSON 0.08 MG/ML
INJECTION, SOLUTION INTRAVENOUS
Status: COMPLETED
Start: 2025-04-10 | End: 2025-04-10

## 2025-04-10 NOTE — ED PROVIDER NOTES
Patient Seen in: [facility] Emergency Department      History     Chief Complaint   Patient presents with    Dizziness     Stated Complaint: Near-Syncope    Subjective:   HPI  79-year-old male with polymyalgia rheumatica on prednisone, hypertension, high cholesterol, history of PE no longer on anticoagulants who presents for evaluation of dizziness and syncope.  His son provides a history that while he was in the bathroom he was feeling chest pain, short of breath, and became sweaty.  He did fall and lose consciousness.  EMS arrived and reported that his blood pressure was less than 90mmHg systolic.  No recent changes in his medication.  He is on lisinopril, low-dose prednisone, baby aspirin, and a statin.  No fever or chills, nausea or vomiting, no leg swelling, no recent travel or hospitalization.  His son reports that when this happened once in the past his diagnosis was PE.        Objective:     Past Medical History:    Deep vein thrombosis (HCC)    Essential hypertension    Glaucoma    High blood pressure    High cholesterol    HYPERTENSION    Pulmonary embolism (HCC)    SCC (squamous cell carcinoma)    Right ear    SCC (squamous cell carcinoma)    Right frontal scalp              Past Surgical History:   Procedure Laterality Date    Colonoscopy N/A 7/15/2019    Procedure: COLONOSCOPY;  Surgeon: Yaya Alexander MD;  Location: [facility] END[facility]                Social History     Socioeconomic History    Marital status:    Tobacco Use    Smoking status: Never     Passive exposure: Never    Smokeless tobacco: Never   Vaping Use    Vaping status: Never Used   Substance and Sexual Activity    Alcohol use: No    Drug use: No   Other Topics Concern    Grew up on a farm No    History of tanning Yes    Outdoor occupation No    Reaction to local anesthetic No    Pt has a pacemaker No    Pt has a defibrillator No                  Physical Exam     ED Triage Vitals [04/09/25 2322]   /65   Pulse (!) 48    Resp 18   Temp 97 °F (36.1 °C)   Temp src Temporal   SpO2 96 %   O2 Device None (Room air)       Current Vitals:   Vital Signs  BP: 148/78  Pulse: 57  Resp: 16  Temp: 97 °F (36.1 °C)  Temp src: Temporal  MAP (mmHg): 99    Oxygen Therapy  SpO2: 98 %  O2 Device: None (Room air)        Physical Exam  Vitals and nursing note reviewed.   Constitutional:       Appearance: He is well-developed.   HENT:      Head: Normocephalic and atraumatic.      Mouth/Throat:      Mouth: Mucous membranes are moist.      Pharynx: Oropharynx is clear.   Eyes:      General: No visual field deficit.     Extraocular Movements: Extraocular movements intact.      Pupils: Pupils are equal, round, and reactive to light.   Cardiovascular:      Rate and Rhythm: Regular rhythm. Bradycardia present.      Heart sounds: Normal heart sounds.   Pulmonary:      Effort: Pulmonary effort is normal.      Breath sounds: Normal breath sounds.   Abdominal:      General: There is no distension.      Palpations: Abdomen is soft.      Tenderness: There is no abdominal tenderness.   Musculoskeletal:         General: Normal range of motion.      Cervical back: Normal range of motion and neck supple. No tenderness.   Skin:     General: Skin is warm.      Capillary Refill: Capillary refill takes less than 2 seconds.   Neurological:      Mental Status: He is alert.      GCS: GCS eye subscore is 4. GCS verbal subscore is 5. GCS motor subscore is 6.      Cranial Nerves: No cranial nerve deficit, dysarthria or facial asymmetry.      Comments: No focal deficits       Differential diagnosis includes but is not limited to heart block, arrhythmia, PE, electrolyte derangement, ACS/MI, adrenal insufficiency    ED Course     Labs Reviewed   CBC WITH DIFFERENTIAL WITH PLATELET - Abnormal; Notable for the following components:       Result Value    .0 (*)     Monocyte Absolute 1.28 (*)     All other components within normal limits   BASIC METABOLIC PANEL (8) - Abnormal;  Notable for the following components:    Glucose 134 (*)     Creatinine 1.51 (*)     eGFR-Cr 46 (*)     All other components within normal limits   HEPATIC FUNCTION PANEL (7) - Abnormal; Notable for the following components:    AST 41 (*)     ALT 74 (*)     All other components within normal limits   TROPONIN I HIGH SENSITIVITY - Normal   PROTHROMBIN TIME (PT) - Normal   PTT, ACTIVATED - Normal   PRO BETA NATRIURETIC PEPTIDE - Normal   SCAN SLIDE   RAINBOW DRAW LAVENDER   RAINBOW DRAW LIGHT GREEN   RAINBOW DRAW BLUE     EKG    Rate, intervals and axes as noted on EKG Report.  Rate: 48  Rhythm: Sinus Rhythm  Reading: No STEMI, there are T wave inversions in V4 through V6 which are new compared to prior EKG, QTc is 471 ms            Preliminary Radiology Report  Formerly [facility], [facility]  [phone] - Phone    [facility]    NAME: HENRY CASEY    DATE OF EXAM: 04/10/2025  Patient No:  [id number]  Physician:  JENNIE  YOB: 1945    Past Medical History (entered by Technologist):    Reason For Exam (entered by Technologist):  Dizziness, hypotension, near-syncope.  Other Notes (entered by Technologist): POD 1: [phone]    Additional Information (per [facility]):      CT head    CTA chest PE      IMPRESSION:    CT head-    No acute intracranial process.     Mild volume loss/white matter change. Query few tiny chronic lacunar infarcts vs dilated perivascular spaces. Cataract surgery. Mild paranasal sinus inflammatory mucosal thickening. Arterial calcs. Partially empty sella turcica.     CTA chest PE-    Comparison- CT abd/pelvis of 9/10/20 and CTA chest PE of 11/18/18    Mild artifact. No definite acute pulmonary emboli. Again seen is chronic central right pulmonary embolic change/pulmonary arterial web formation.     Atherosclerosis; coronary calcs; no overt acute arterial process.     Mild lung base fibrotic changes; mild associated mosaic attenuation/groundglass opacity-  minor pneumonitis not excluded; no significant infection currently.     Mild hiatal hernia; mildly patulous esophagus. Chronic granulomatous changes. Mildly prominent/reactive mediastinal lymph nodes. Similar minor pulmonary nodularity, such as in the AMELIA. Cholelithasis and right nephrolithiasis. Otherwise, minor change.     Report faxed at 1:55AM CT.     Adalid Wilson M.D.           MDM              Medical Decision Making  On arrival to the ED, patient noted to be bradycardic though normotensive.  He is warm and well-perfused and mentating normally.  CBC shows thrombocytopenia.  BMP with creatinine of 1.51, there is mild elevation in LFTs.  Troponin and BNP within normal limits.  Per my independent interpretation of CT chest, no acute PE.  Patient will be admitted to the hospital for further management.  Discussed with Dr. Oviedo for admission and Dr Damon for consultation.    Problems Addressed:  Sinus bradycardia: complicated acute illness or injury with systemic symptoms that poses a threat to life or bodily functions  Syncope and collapse: complicated acute illness or injury with systemic symptoms that poses a threat to life or bodily functions    Amount and/or Complexity of Data Reviewed  Labs: ordered. Decision-making details documented in ED Course.  Radiology: ordered and independent interpretation performed. Decision-making details documented in ED Course.  ECG/medicine tests: ordered and independent interpretation performed. Decision-making details documented in ED Course.  Discussion of management or test interpretation with external provider(s): As above    Risk  Decision regarding hospitalization.        Disposition and Plan     Clinical Impression:  1. Syncope and collapse    2. Sinus bradycardia         Disposition:  Admit  4/10/2025  1:59 am    Follow-up:  No follow-up provider specified.  We recommend that you schedule follow up care with a primary care provider within the next three months to  obtain basic health screening including reassessment of your blood pressure.      Medications Prescribed:  Current Discharge Medication List              Supplementary Documentation:         Hospital Problems       Present on Admission  Date Reviewed: 2/24/2025          ICD-10-CM Noted POA    * (Principal) Syncope and collapse R55 4/10/2025 Unknown

## 2025-04-10 NOTE — PLAN OF CARE
Patient alert, oriented, Telugu speaking, no c/o pain, stress test done, awaiting 2 D Echo, family at bedside  Problem: Patient Centered Care  Goal: Patient preferences are identified and integrated in the patient's plan of care  Description: Interventions:- What would you like us to know as we care for you?I live at home with family  - Provide timely, complete, and accurate information to patient/family- Incorporate patient and family knowledge, values, beliefs, and cultural backgrounds into the planning and delivery of care- Encourage patient/family to participate in care and decision-making at the level they choose- Honor patient and family perspectives and choices  Outcome: Progressing     Problem: Patient/Family Goals  Goal: Patient/Family Long Term Goal  Description: Patient's Long Term Goal: Interventions:- - See additional Care Plan goals for specific interventions  Outcome: Progressing  Goal: Patient/Family Short Term Goal  Description: Patient's Short Term Goal: Interventions: - - See additional Care Plan goals for specific interventions  Outcome: Progressing

## 2025-04-10 NOTE — ED INITIAL ASSESSMENT (HPI)
Pt arrived to ED for dizziness onset 1 hour ago. Per son, pt is unsure if he had a syncopal episode earlier. Pt was seen by EMS earlier today and was told he was hypotensive. A/O x3 and speaking complete sentences.

## 2025-04-10 NOTE — CONSULTS
[facility] CARDIOLOGY CONSULT      Lang Kramer is a 79 year old male who I assessed as follows:  Chief Complaint   Patient presents with    Dizziness          REASON FOR CONSULTATION:    syncope            ASSESSMENT/PLAN:     IMPRESSIONS:  Syncope, sounds probably vagal  Chest pain  History pulmonary embolus  HTN  HL, on statin/zetia        PLAN:  Echo  Tele  ECG reviewed  Pharm nuclear stress  BP meds reviewed    Speaks Central African. Daughter at bedside.         --------------------------------------------------------------------------------------------------------------------------------    HPI:       History HTN, HL presents with syncope. Was in bathroom. Had emesis. Then felt clammy, lightheadedness. Felt chest tightness (mid chest). Then syncope and fell to floor. Afterward felt diaphoretic.     Similar symptoms prior to PE in past.     No prior syncope.     Speaks [place] African.         Current Medications[1]   Past Medical History[2]  Past Surgical History[3]  Family History[4]   Social History:  Short Social Hx on File[5]       Medications:  Current Hospital Medications[6]        Allergies  Allergies[7]            REVIEW OF SYSTEMS:   GENERAL HEALTH: no fevers  SKIN: denies any unusual skin lesions or rashes   EARS: no recent changes in hearing  EYE: no recent vision changes  THROAT: denies sore throat  RESPIRATORY: denies cough or shortness of breath with exertion  CARDIOVASCULAR: chest pain, syncope  GI: vomiting  NEURO: denies headaches and focal neurologic symptoms  PSYCH: no recent depression  ENDOCRINE: no change in sleep pattern  HEME: no easy bruising  All other systems reviewed and negative.          EXAM:         TELE: SR          /78 (BP Location: Right arm)   Pulse 52   Temp 97.5 °F (36.4 °C) (Oral)   Resp 18   Ht 5' 6\" (1.676 m)   Wt 170 lb 10.2 oz (77.4 kg)   SpO2 95%   BMI 27.54 kg/m²   Temp (24hrs), Av.3 °F (36.3 °C), Min:97 °F (36.1 °C), Max:97.5 °F (36.4 °C)    Wt  Readings from Last 3 Encounters:   04/10/25 170 lb 10.2 oz (77.4 kg)   02/24/25 177 lb 8 oz (80.5 kg)   10/28/24 176 lb (79.8 kg)       No intake or output data in the 24 hours ending 04/10/25 0859      GENERAL: well developed, well nourished, in no apparent distress  SKIN: no rashes  HEENT: atraumatic, normocephalic, throat without erythema  NECK: supple, no bruits  LUNGS: clear to auscultation  CARDIO: RRR without murmur or S3   GI: soft, nontender  EXTREMITIES: no cyanosis, clubbing or edema  NEUROLOGY: alert  PSYCH: cooperative          LABORATORY DATA:               ECG:        ECHO:          Labs:  Recent Labs   Lab 04/10/25  0005   *   BUN 21   CREATSERUM 1.51*   CA 9.3      K 4.1      CO2 22.0     No results for input(s): \"TROP\" in the last 168 hours.  Recent Labs   Lab 04/10/25  0005   RBC 5.64   HGB 17.0   HCT 50.8   MCV 90.1   MCH 30.1   MCHC 33.5   RDW 13.6   NEPRELIM 6.46   WBC 10.2   .0*     Recent Labs   Lab 04/10/25  0005 04/10/25  0412 04/10/25  0631   TROPHS 9 8 10       Imaging:  CT BRAIN OR HEAD (CPT=70450)  Result Date: 4/10/2025  CONCLUSION:  1.  No acute intracranial process. 2.  There are mild microvascular white matter ischemic changes, likely related to long-standing hypertension and/or diabetes. 3.  Atherosclerosis in the anterior and posterior circulations.  [facility] provided a preliminary report for this examination. This final report agrees with their preliminary findings.   Dictated by (CST): Cal Zheng MD on 4/10/2025 at 8:16 AM     Finalized by (CST): Cal Zheng MD on 4/10/2025 at 8:18 AM          CT CHEST PE AORTA (IV ONLY) (CPT=71260)  Result Date: 4/10/2025  CONCLUSION:  1.  No acute pulmonary embolus to the subsegmental pulmonary artery level.  No acute aortic injury; no dissection.  Chronic nonocclusive pulmonary embolus/vascular web within the peripheral aspect the right pulmonary artery. 2.  There is evidence for prior granulomatous  disease exposure within the lungs and mediastinum/jose. 3.  Coronary atherosclerosis. 4.  Small hiatal hernia. 5.  Nonobstructing calculi within the visualized right kidney.  Right renal cyst. 6.  Nonspecific 17 x 18 mm subcarinal node, similar to prior exam from 2019, favoring benign/reactive etiology. 7.  Cholelithiasis. 8.  Central peripheral airway inflammation.  Interstitial changes compatible with fibrosis. 9.  4 mm left upper lobe pulmonary nodule, new since 2019.  Recommend follow-up CT chest in 12 months.  This could represent a noncalcified granuloma, other parenchymal nodule, or airway secretions.  Additional smaller nodular foci are also scattered throughout the lungs.  [facility] provided a preliminary report for this examination. This final report agrees with their preliminary findings.   Dictated by (CST): Cal Zheng MD on 4/10/2025 at 8:07 AM     Finalized by (CST): Cal Zheng MD on 4/10/2025 at 8:14 AM                 Hong Vazquez MD        [1]   No current outpatient medications on file.   [2]   Past Medical History:   Deep vein thrombosis (HCC)    Essential hypertension    Glaucoma    High blood pressure    High cholesterol    HYPERTENSION    Pulmonary embolism (HCC)    SCC (squamous cell carcinoma)    Right ear    SCC (squamous cell carcinoma)    Right frontal scalp   [3]   Past Surgical History:  Procedure Laterality Date    Colonoscopy N/A 7/15/2019    Procedure: COLONOSCOPY;  Surgeon: Yaya Alexander MD;  Location: [facility] ENDOSCOPY   [4] History reviewed. No pertinent family history.  [5]   Social History  Socioeconomic History    Marital status:    Tobacco Use    Smoking status: Never     Passive exposure: Never    Smokeless tobacco: Never   Vaping Use    Vaping status: Never Used   Substance and Sexual Activity    Alcohol use: No    Drug use: No   Other Topics Concern    Grew up on a farm No    History of tanning Yes    Outdoor occupation No    Reaction to local  anesthetic No    Pt has a pacemaker No    Pt has a defibrillator No     Social Drivers of Health     Food Insecurity: No Food Insecurity (4/10/2025)    NCSS - Food Insecurity     Worried About Running Out of Food in the Last Year: No     Ran Out of Food in the Last Year: No   Transportation Needs: No Transportation Needs (4/10/2025)    NCSS - Transportation     Lack of Transportation: No   Housing Stability: Not At Risk (4/10/2025)    NCSS - Housing/Utilities     Has Housing: Yes     Worried About Losing Housing: No     Unable to Get Utilities: No   [6]   Current Facility-Administered Medications   Medication Dose Route Frequency    amLODIPine (Norvasc) tab 5 mg  5 mg Oral Daily    atorvastatin (Lipitor) tab 20 mg  20 mg Oral Daily    dorzolamide-timolol (Cosopt) 2-0.5 % ophthalmic solution 1 drop  1 drop Both Eyes TID    ezetimibe (Zetia) tab 10 mg  10 mg Oral Daily    latanoprost (Xalatan) 0.005 % ophthalmic solution 1 drop  1 drop Both Eyes Nightly    lisinopril (Zestril) tab 10 mg  10 mg Oral Daily    predniSONE (Deltasone) tab 2.5 mg  2.5 mg Oral Daily with breakfast    [Held by provider] Netarsudil Dimesylate (Rhopressa) 0.02 % SOLN 1 drop - PT'S OWN MEDICATION  1 drop Left Eye Nightly    enoxaparin (Lovenox) 40 MG/0.4ML SUBQ injection 40 mg  40 mg Subcutaneous Daily    acetaminophen (Tylenol Extra Strength) tab 500 mg  500 mg Oral Q4H PRN    melatonin tab 3 mg  3 mg Oral Nightly PRN    polyethylene glycol (PEG 3350) (Miralax) 17 g oral packet 17 g  17 g Oral Daily PRN    sennosides (Senokot) tab 17.2 mg  17.2 mg Oral Nightly PRN    bisacodyl (Dulcolax) 10 MG rectal suppository 10 mg  10 mg Rectal Daily PRN    fleet enema (Fleet) rectal enema 133 mL  1 enema Rectal Once PRN    ondansetron (Zofran) 4 MG/2ML injection 4 mg  4 mg Intravenous Q6H PRN    metoclopramide (Reglan) 5 mg/mL injection 5 mg  5 mg Intravenous Q8H PRN   [7] No Known Allergies

## 2025-04-10 NOTE — H&P
[facility] [place] [facility] H&P     CC:   Chief Complaint   Patient presents with    Dizziness      PCP: HAL CHANDRA    Date of Admission: 4/9/2025 11:30 PM    ASSESSMENT / PLAN:     Mr. Kramer is an 78yo M with PMH of HTN, glaucoma, DVT/PE, SCC who presented with syncope.     Syncope  - possible orthostatic hypotension vs. Arrythmia  - +orthostatics, will give IVF and repeat orthostatics  - CT PE negative for acute PE in ER  - monitor on tele  - cardiology consulted, recommended stress test and TTE    HTN  - BP stable  - home lisinopril    Glaucoma  - continue home eye gtt    Hx DVT/PE  - diagnosed 11/11/2018, treated with Eliquis, unprovoked, did have issues with hematuria while on Eliquis    AMELIA pulmonary nodule  - 4mm nodule noted on CT, new from 2019  - recommended f/u CT in 12 months    FN:  - IVF: NS  - Diet: cardiac    DVT Prophy: SCD, HSQ  Lines: PIV    Dispo: pending clinical course    Outpatient records or previous hospital records reviewed.     Further recommendations pending patient's clinical course.  [facility] [place] [facility] to continue to follow patient while in house    Patient and/or patient's family given opportunity to ask questions and note understanding and agreeing with therapeutic plan as outlined    Zoe Wright MD  [facility] [place] [facility]  Answering Service number: [phone]    HPI       History of Present Illness:     Mr. Kramer is an 78yo M with PMH of HTN, glaucoma, DVT/PE, SCC who presented with syncope. History obtained from granddaughter/POA. Patient lives with wife, had been praying yesterday, kneeling on the floor, started to feel dizzy, nauseated, had chest pain, was dry heaving and then passed out for about 15 seconds per wife. EMS called, HR 58 in EMS. Had similar symptom with PE in 2018, unprovoked, treated with Eliquis at that time.     In the ED, vitals stable. Labs with normal troponin. CT brain and CT PE negative.     PMH  Past Medical History:    Deep vein thrombosis (HCC)    Essential  hypertension    Glaucoma    High blood pressure    High cholesterol    HYPERTENSION    Pulmonary embolism (HCC)    SCC (squamous cell carcinoma)    Right ear    SCC (squamous cell carcinoma)    Right frontal scalp        PSH  Past Surgical History:   Procedure Laterality Date    Colonoscopy N/A 7/15/2019    Procedure: COLONOSCOPY;  Surgeon: Yaya Alexander MD;  Location: [facility] ENDOSCOPY        ALL:  No Known Allergies     Home Medications:  Outpatient Medications Marked as Taking for the 4/9/25 encounter (Hospital Encounter)   Medication Sig Dispense Refill    VYZULTA 0.024 % Ophthalmic Solution Place 1 drop into both eyes nightly.      RHOPRESSA 0.02 % Ophthalmic Solution Place 1 drop into the left eye nightly.      cholecalciferol 1.25 MG (67708 UT) Oral Cap Take 1 capsule (50,000 Units total) by mouth once a week.      ezetimibe 10 MG Oral Tab Take 1 tablet (10 mg total) by mouth in the morning.      lisinopril 10 MG Oral Tab       tobramycin 0.3 % Ophthalmic Solution       Dorzolamide HCl-Timolol Mal 22.3-6.8 MG/ML Ophthalmic Solution Place 1 drop into both eyes 3 (three) times daily. 1 Bottle 0    latanoprost 0.005 % Ophthalmic Solution Place 1 drop into both eyes nightly.           Soc Hx  Social History     Tobacco Use    Smoking status: Never     Passive exposure: Never    Smokeless tobacco: Never   Substance Use Topics    Alcohol use: No        Fam Hx  Family History[1]    Review of Systems  Comprehensive ROS reviewed and negative except for what's stated above.     OBJECTIVE:  /78 (BP Location: Right arm)   Pulse 52   Temp 97.5 °F (36.4 °C) (Oral)   Resp 18   Ht 5' 6\" (1.676 m)   Wt 170 lb 10.2 oz (77.4 kg)   SpO2 95%   BMI 27.54 kg/m²     GEN: elderly male in NAD  HEENT: EOMI  Pulm: CTAB, no crackles or wheezes  CV: RRR, no murmurs  ABD: Soft, non-tender, non-distended, +BS  SKIN: warm, dry  EXT: no edema    Diagnostic Data:    CBC/Chem    Recent Labs   Lab 04/10/25  0005   RBC 5.64   HGB  17.0   HCT 50.8   MCV 90.1   MCH 30.1   MCHC 33.5   RDW 13.6   NEPRELIM 6.46   WBC 10.2   .0*         Recent Labs   Lab 04/10/25  0005   *   BUN 21   CREATSERUM 1.51*   EGFRCR 46*   CA 9.3      K 4.1      CO2 22.0     Lab Results   Component Value Date    WBC 10.2 04/10/2025    HGB 17.0 04/10/2025    HCT 50.8 04/10/2025    .0 04/10/2025    CREATSERUM 1.51 04/10/2025    BUN 21 04/10/2025     04/10/2025    K 4.1 04/10/2025     04/10/2025    CO2 22.0 04/10/2025     04/10/2025    CA 9.3 04/10/2025    ALB 4.0 04/10/2025    ALKPHO 80 04/10/2025    BILT 0.6 04/10/2025    TP 7.6 04/10/2025    AST 41 04/10/2025    ALT 74 04/10/2025    PTT 23.2 04/10/2025    INR 0.97 04/10/2025    TROPHS 10 04/10/2025       No results for input(s): \"TROP\" in the last 168 hours.    Additional Diagnostics: ECG: sinus bradycardia      Radiology: CARD NUC STRESS TEST LEXISCAN (CPT 84343/65869/)  Result Date: 4/10/2025  CONCLUSION:  Normal regadenoson (Lexiscan) perfusion imaging study with normal ejection fraction and wall motion. There is no evidence of reversible stress-induced ischemia.    Dictated by (CST): Jeffrey Vaughn MD on 4/10/2025 at 12:46 PM     Finalized by (CST): Jeffrey Vaughn MD on 4/10/2025 at 12:47 PM          CT BRAIN OR HEAD (CPT=70450)  Result Date: 4/10/2025  CONCLUSION:  1.  No acute intracranial process. 2.  There are mild microvascular white matter ischemic changes, likely related to long-standing hypertension and/or diabetes. 3.  Atherosclerosis in the anterior and posterior circulations.  [facility] provided a preliminary report for this examination. This final report agrees with their preliminary findings.   Dictated by (CST): Cal Zheng MD on 4/10/2025 at 8:16 AM     Finalized by (CST): Cal Zheng MD on 4/10/2025 at 8:18 AM          CT CHEST PE AORTA (IV ONLY) (CPT=71260)  Result Date: 4/10/2025  CONCLUSION:  1.  No acute pulmonary embolus to the  subsegmental pulmonary artery level.  No acute aortic injury; no dissection.  Chronic nonocclusive pulmonary embolus/vascular web within the peripheral aspect the right pulmonary artery. 2.  There is evidence for prior granulomatous disease exposure within the lungs and mediastinum/jose. 3.  Coronary atherosclerosis. 4.  Small hiatal hernia. 5.  Nonobstructing calculi within the visualized right kidney.  Right renal cyst. 6.  Nonspecific 17 x 18 mm subcarinal node, similar to prior exam from 2019, favoring benign/reactive etiology. 7.  Cholelithiasis. 8.  Central peripheral airway inflammation.  Interstitial changes compatible with fibrosis. 9.  4 mm left upper lobe pulmonary nodule, new since 2019.  Recommend follow-up CT chest in 12 months.  This could represent a noncalcified granuloma, other parenchymal nodule, or airway secretions.  Additional smaller nodular foci are also scattered throughout the lungs.  [facility] provided a preliminary report for this examination. This final report agrees with their preliminary findings.   Dictated by (CST): Cal Zheng MD on 4/10/2025 at 8:07 AM     Finalized by (CST): Cal Zheng MD on 4/10/2025 at 8:14 AM                   [1] History reviewed. No pertinent family history.

## 2025-04-10 NOTE — ED QUICK NOTES
Orders for admission, patient is aware of plan and ready to go upstairs. Any questions, please call ED RN tree at extension [phone].     Patient Covid vaccination status: Fully vaccinated     COVID Test Ordered in ED: None    COVID Suspicion at Admission: N/A    Running Infusions: Medication Infusions[1] None    Mental Status/LOC at time of transport: AOx4    Other pertinent information: ukranian speaking   CIWA score: N/A   NIH score:  N/A             [1]

## 2025-04-10 NOTE — PLAN OF CARE
Problem: Patient Centered Care  Goal: Patient preferences are identified and integrated in the patient's plan of care  Description: Interventions:- What would you like us to know as we care for you?I live at home with family  - Provide timely, complete, and accurate information to patient/family- Incorporate patient and family knowledge, values, beliefs, and cultural backgrounds into the planning and delivery of care- Encourage patient/family to participate in care and decision-making at the level they choose- Honor patient and family perspectives and choices  Outcome: Progressing     Problem: Patient/Family Goals  Goal: Patient/Family Long Term Goal  Description: Patient's Long Term Goal: Interventions:- - See additional Care Plan goals for specific interventions  Outcome: Progressing  Goal: Patient/Family Short Term Goal  Description: Patient's Short Term Goal: Interventions: - - See additional Care Plan goals for specific interventions  Outcome: Progressing     Problem: PAIN - ADULT  Goal: Verbalizes/displays adequate comfort level or patient's stated pain goal  Description: INTERVENTIONS:- Encourage pt to monitor pain and request assistance- Assess pain using appropriate pain scale- Administer analgesics based on type and severity of pain and evaluate response- Implement non-pharmacological measures as appropriate and evaluate response- Consider cultural and social influences on pain and pain management- Manage/alleviate anxiety- Utilize distraction and/or relaxation techniques- Monitor for opioid side effects- Notify MD/LIP if interventions unsuccessful or patient reports new pain- Anticipate increased pain with activity and pre-medicate as appropriate  Outcome: Progressing     Problem: SAFETY ADULT - FALL  Goal: Free from fall injury  Description: INTERVENTIONS:- Assess pt frequently for physical needs- Identify cognitive and physical deficits and behaviors that affect risk of falls.- [place] fall  precautions as indicated by assessment.- Educate pt/family on patient safety including physical limitations- Instruct pt to call for assistance with activity based on assessment- Modify environment to reduce risk of injury- Provide assistive devices as appropriate- Consider OT/PT consult to assist with strengthening/mobility- Encourage toileting schedule  Outcome: Progressing     Problem: DISCHARGE PLANNING  Goal: Discharge to home or other facility with appropriate resources  Description: INTERVENTIONS:- Identify barriers to discharge w/pt and caregiver- Include patient/family/discharge partner in discharge planning- Arrange for needed discharge resources and transportation as appropriate- Identify discharge learning needs (meds, wound care, etc)- Arrange for interpreters to assist at discharge as needed- Consider post-discharge preferences of patient/family/discharge partner- Complete POLST form as appropriate- Assess patient's ability to be responsible for managing their own health- Refer to Case Management Department for coordinating discharge planning if the patient needs post-hospital services based on physician/LIP order or complex needs related to functional status, cognitive ability or social support system  Outcome: Progressing     Problem: Altered Communication/Language Barrier  Goal: Patient/Family is able to understand and participate in their care  Description: Interventions:- Assess communication ability and preferred communication style- Implement communication aides and strategies- Use visual cues when possible- Listen attentively, be patient, do not interrupt- Minimize distractions- Allow time for understanding and response- Establish method for patient to ask for assistance (call light)- Provide an  as needed- Communicate barriers and strategies to overcome with those who interact with patient  Outcome: Progressing

## 2025-04-11 VITALS
DIASTOLIC BLOOD PRESSURE: 72 MMHG | TEMPERATURE: 99 F | SYSTOLIC BLOOD PRESSURE: 120 MMHG | RESPIRATION RATE: 18 BRPM | WEIGHT: 170.63 LBS | HEIGHT: 66 IN | HEART RATE: 68 BPM | OXYGEN SATURATION: 93 % | BODY MASS INDEX: 27.42 KG/M2

## 2025-04-11 LAB
ALBUMIN SERPL-MCNC: 4.2 G/DL (ref 3.2–4.8)
ALBUMIN/GLOB SERPL: 1.4 {RATIO} (ref 1–2)
ALP LIVER SERPL-CCNC: 70 U/L (ref 45–117)
ALT SERPL-CCNC: 66 U/L (ref 10–49)
ANION GAP SERPL CALC-SCNC: 8 MMOL/L (ref 0–18)
AST SERPL-CCNC: 33 U/L (ref ?–34)
BILIRUB SERPL-MCNC: 0.8 MG/DL (ref 0.2–1.1)
BUN BLD-MCNC: 29 MG/DL (ref 9–23)
BUN/CREAT SERPL: 19.5 (ref 10–20)
CALCIUM BLD-MCNC: 9.5 MG/DL (ref 8.7–10.4)
CHLORIDE SERPL-SCNC: 107 MMOL/L (ref 98–112)
CO2 SERPL-SCNC: 28 MMOL/L (ref 21–32)
CREAT BLD-MCNC: 1.49 MG/DL (ref 0.7–1.3)
DEPRECATED RDW RBC AUTO: 46.5 FL (ref 35.1–46.3)
EGFRCR SERPLBLD CKD-EPI 2021: 47 ML/MIN/1.73M2 (ref 60–?)
ERYTHROCYTE [DISTWIDTH] IN BLOOD BY AUTOMATED COUNT: 13.7 % (ref 11–15)
GLOBULIN PLAS-MCNC: 3.1 G/DL (ref 2–3.5)
GLUCOSE BLD-MCNC: 90 MG/DL (ref 70–99)
HCT VFR BLD AUTO: 44.6 % (ref 39–53)
HGB BLD-MCNC: 15 G/DL (ref 13–17.5)
MAGNESIUM SERPL-MCNC: 1.7 MG/DL (ref 1.6–2.6)
MCH RBC QN AUTO: 30.9 PG (ref 26–34)
MCHC RBC AUTO-ENTMCNC: 33.6 G/DL (ref 31–37)
MCV RBC AUTO: 91.8 FL (ref 80–100)
OSMOLALITY SERPL CALC.SUM OF ELEC: 301 MOSM/KG (ref 275–295)
PLATELET # BLD AUTO: 144 10(3)UL (ref 150–450)
PLATELETS.RETICULATED NFR BLD AUTO: 5 % (ref 0–7)
POTASSIUM SERPL-SCNC: 3.9 MMOL/L (ref 3.5–5.1)
PROT SERPL-MCNC: 7.3 G/DL (ref 5.7–8.2)
RBC # BLD AUTO: 4.86 X10(6)UL (ref 3.8–5.8)
SODIUM SERPL-SCNC: 143 MMOL/L (ref 136–145)
WBC # BLD AUTO: 8.6 X10(3) UL (ref 4–11)

## 2025-04-11 PROCEDURE — 85027 COMPLETE CBC AUTOMATED: CPT | Performed by: INTERNAL MEDICINE

## 2025-04-11 PROCEDURE — 83735 ASSAY OF MAGNESIUM: CPT | Performed by: INTERNAL MEDICINE

## 2025-04-11 PROCEDURE — 80053 COMPREHEN METABOLIC PANEL: CPT | Performed by: INTERNAL MEDICINE

## 2025-04-11 PROCEDURE — 94760 N-INVAS EAR/PLS OXIMETRY 1: CPT

## 2025-04-11 RX ORDER — PREDNISONE 1 MG/1
2.5 TABLET ORAL
Status: SHIPPED | COMMUNITY
Start: 2025-04-11

## 2025-04-11 RX ORDER — MAGNESIUM OXIDE 400 MG/1
400 TABLET ORAL ONCE
Status: COMPLETED | OUTPATIENT
Start: 2025-04-11 | End: 2025-04-11

## 2025-04-11 NOTE — DISCHARGE SUMMARY
General Medicine Discharge Summary     Patient ID:  Lang Kramer  79 year old  7/2/1945    Admit date: 4/9/2025    Discharge date and time: 04/11/25    Attending Physician: Zoe Wright MD     Primary Care Physician: HAL CHANDRA     Reason for admission: syncope    Discharge Diagnoses: Syncope and collapse [R55]  Sinus bradycardia [R00.1]    Discharged Condition: stable    Disposition: home    Consults:   Consultants         Provider   Role Specialty     Mary Damon MD      Consulting Physician Clinical Cardiac Electrophysiology     Amadeo Oviedo MD      Consulting Physician Internal Medicine              HPI: Mr. Kramer is an 80yo M with PMH of HTN, glaucoma, DVT/PE, SCC who presented with syncope. History obtained from granddaughter/POA. Patient lives with wife, had been praying yesterday, kneeling on the floor, started to feel dizzy, nauseated, had chest pain, was dry heaving and then passed out for about 15 seconds per wife. EMS called, HR 58 in EMS. Had similar symptom with PE in 2018, unprovoked, treated with Eliquis at that time.      In the ED, vitals stable. Labs with normal troponin. CT brain and CT PE negative.     Hospital Course:     Mr. Kramer is an 80yo M with PMH of HTN, glaucoma, DVT/PE, SCC who presented with syncope. CT PE negative for PE. Cardiology consulted, no arrhythmia on tele. Stress test negative. TTE normal. +orthostatics, improved with IVF bolus and ANISA hose, discussed hydration and wearing compression socks with family. Stable for discharge home.      Syncope  - possible orthostatic hypotension vs. Arrythmia  - +orthostatics, will give IVF and repeat orthostatics, compression socks  - CT PE negative for acute PE in ER  - monitor on tele  - cardiology consulted, recommended stress test negative and TTE- results pending     JOELLE vs. CKD  - Cr 1.5 on admission  - baseline Cr 1.2-1.4  - IVF ordered     HTN  - BP stable  - home lisinopril     Glaucoma  - continue home eye gtt      Hx DVT/PE  - diagnosed 11/11/2018, treated with Eliquis, unprovoked, did have issues with hematuria while on Eliquis     AMELIA pulmonary nodule  - 4mm nodule noted on CT, new from 2019  - recommended f/u CT in 12 months    Exam  GEN: elderly male in NAD  HEENT: EOMI  Pulm: CTAB, no crackles or wheezes  CV: RRR, no murmurs  ABD: Soft, non-tender, non-distended, +BS  SKIN: warm, dry  EXT: no edema    Operative Procedures:      Imaging: CARD NUC STRESS TEST LEXISCAN (CPT 71283/97742/)  Result Date: 4/10/2025  CONCLUSION:  Normal regadenoson (Lexiscan) perfusion imaging study with normal ejection fraction and wall motion. There is no evidence of reversible stress-induced ischemia.    Dictated by (CST): Jeffrey Vaughn MD on 4/10/2025 at 12:46 PM     Finalized by (CST): Jeffrey Vaughn MD on 4/10/2025 at 12:47 PM          CT BRAIN OR HEAD (CPT=70450)  Result Date: 4/10/2025  CONCLUSION:  1.  No acute intracranial process. 2.  There are mild microvascular white matter ischemic changes, likely related to long-standing hypertension and/or diabetes. 3.  Atherosclerosis in the anterior and posterior circulations.  [facility] provided a preliminary report for this examination. This final report agrees with their preliminary findings.   Dictated by (CST): Cal Zheng MD on 4/10/2025 at 8:16 AM     Finalized by (CST): Cal Zheng MD on 4/10/2025 at 8:18 AM          CT CHEST PE AORTA (IV ONLY) (CPT=71260)  Result Date: 4/10/2025  CONCLUSION:  1.  No acute pulmonary embolus to the subsegmental pulmonary artery level.  No acute aortic injury; no dissection.  Chronic nonocclusive pulmonary embolus/vascular web within the peripheral aspect the right pulmonary artery. 2.  There is evidence for prior granulomatous disease exposure within the lungs and mediastinum/jose. 3.  Coronary atherosclerosis. 4.  Small hiatal hernia. 5.  Nonobstructing calculi within the visualized right kidney.  Right renal cyst. 6.  Nonspecific 17  x 18 mm subcarinal node, similar to prior exam from 2019, favoring benign/reactive etiology. 7.  Cholelithiasis. 8.  Central peripheral airway inflammation.  Interstitial changes compatible with fibrosis. 9.  4 mm left upper lobe pulmonary nodule, new since 2019.  Recommend follow-up CT chest in 12 months.  This could represent a noncalcified granuloma, other parenchymal nodule, or airway secretions.  Additional smaller nodular foci are also scattered throughout the lungs.  [facility] provided a preliminary report for this examination. This final report agrees with their preliminary findings.   Dictated by (CST): Cal Zheng MD on 4/10/2025 at 8:07 AM     Finalized by (CST): Cal Zheng MD on 4/10/2025 at 8:14 AM                Home Medication Changes:     I reconciled current and discharge medications on day of discharge. These medication changes have been made as belo         Medication List        CHANGE how you take these medications      predniSONE 1 MG Tabs  Commonly known as: Deltasone  What changed:   how much to take  Another medication with the same name was removed. Continue taking this medication, and follow the directions you see here.            CONTINUE taking these medications      amLODIPine 5 MG Tabs  Commonly known as: Norvasc     atorvastatin 20 MG Tabs  Commonly known as: Lipitor     cholecalciferol 1.25 MG (24965 UT) Caps  Commonly known as: Vitamin D3     dorzolamide-timolol 2-0.5 % Soln  Commonly known as: Cosopt  Place 1 drop into both eyes 3 (three) times daily.     ezetimibe 10 MG Tabs  Commonly known as: Zetia     Ketorolac Tromethamine 0.4 % Soln  Commonly known as: ACULAR     latanoprost 0.005 % Soln  Commonly known as: Xalatan     lisinopril 10 MG Tabs  Commonly known as: Zestril     prednisoLONE 1 % Susp  Commonly known as: Pred Forte     Rhopressa 0.02 % Soln  Generic drug: Netarsudil Dimesylate     tobramycin 0.3 % Soln  Commonly known as: Tobrex     triamcinolone 0.1 %  Oint  Commonly known as: Kenalog     Vyzulta 0.024 % Soln  Generic drug: Latanoprostene Bunod              Activity: activity as tolerated  Diet: regular diet  Wound Care: none needed  Code Status: Full Code  O2: n/a    Follow-up with:    PCP   Specialist        FU   Follow-up Information       Darnell Devlin Follow up in 1 week(s).    Specialty: Professional Counselor  Contact information:  [address] [address] [phone]                             DC instructions:      Other Discharge Instructions:         Wear compression socks during the day.    Stay hydrated.     See your primary care doctor in 1-2 weeks for hospital follow-up visit.     You will need a repeat CT chest in 1 year.           Follow-up with labs: none    Total Time Coordinating Care: 31 minutes    Patient had opportunity to ask questions and state understand and agree with therapeutic plan as outlined      Zoe Wright MD  [facility] Hospitalist

## 2025-04-11 NOTE — PROGRESS NOTES
DM Hospitalist Progress Note     CC: Hospital Follow up    PCP: HAL CHANDRA       Assessment/Plan:     Principal Problem:    Syncope and collapse  Active Problems:    Sinus bradycardia      Mr. Kramer is an 80yo M with PMH of HTN, glaucoma, DVT/PE, SCC who presented with syncope.      Syncope  - possible orthostatic hypotension vs. Arrythmia  - +orthostatics, will give IVF and repeat orthostatics, compression socks  - CT PE negative for acute PE in ER  - monitor on tele  - cardiology consulted, recommended stress test negative and TTE- results pending     JOELLE vs. CKD  - Cr 1.5 on admission  - baseline Cr 1.2-1.4  - IVF ordered    HTN  - BP stable  - home lisinopril     Glaucoma  - continue home eye gtt     Hx DVT/PE  - diagnosed 11/11/2018, treated with Eliquis, unprovoked, did have issues with hematuria while on Eliquis     AMELIA pulmonary nodule  - 4mm nodule noted on CT, new from 2019  - recommended f/u CT in 12 months     FN:  - IVF: NS  - Diet: cardiac     DVT Prophy: SCD, HSQ  Lines: PIV     Dispo: pending clinical course, possible home today pending TTE results and repeat orthostatics     Outpatient records or previous hospital records reviewed.      Further recommendations pending patient's clinical course.  [facility] hospitalist to continue to follow patient while in house     Patient and/or patient's family given opportunity to ask questions and note understanding and agreeing with therapeutic plan as outlined     Zoe Wright MD  [facility] Hospitalist  Answering Service number: [phone]     Subjective:     Feels ok. +orthostatics, did feel a little dizzy with standing    OBJECTIVE:    Blood pressure (!) 89/64, pulse 65, temperature 98.5 °F (36.9 °C), temperature source Oral, resp. rate 18, height 5' 6\" (1.676 m), weight 170 lb 10.2 oz (77.4 kg), SpO2 94%.    Temp:  [97.5 °F (36.4 °C)-98.5 °F (36.9 °C)] 98.5 °F (36.9 °C)  Pulse:  [50-65] 65  Resp:  [16-18] 18  BP: ()/(64-77) 89/64  SpO2:  [94 %-96 %]  94 %      Intake/Output:    Intake/Output Summary (Last 24 hours) at 4/11/2025 1421  Last data filed at 4/11/2025 0937  Gross per 24 hour   Intake 366 ml   Output 150 ml   Net 216 ml       Last 3 Weights   04/10/25 0326 170 lb 10.2 oz (77.4 kg)   04/09/25 2322 175 lb (79.4 kg)   02/24/25 1540 177 lb 8 oz (80.5 kg)   10/28/24 1206 176 lb (79.8 kg)       Exam  GEN: elderly male in NAD  HEENT: EOMI  Pulm: CTAB, no crackles or wheezes  CV: RRR, no murmurs  ABD: Soft, non-tender, non-distended, +BS  SKIN: warm, dry  EXT: no edema    Data Review:       Labs:     Recent Labs   Lab 04/10/25  0005 04/11/25  0500   RBC 5.64 4.86   HGB 17.0 15.0   HCT 50.8 44.6   MCV 90.1 91.8   MCH 30.1 30.9   MCHC 33.5 33.6   RDW 13.6 13.7   NEPRELIM 6.46  --    WBC 10.2 8.6   .0* 144.0*         Recent Labs   Lab 04/10/25  0005 04/11/25  0500   * 90   BUN 21 29*   CREATSERUM 1.51* 1.49*   EGFRCR 46* 47*   CA 9.3 9.5    143   K 4.1 3.9    107   CO2 22.0 28.0       Recent Labs   Lab 04/10/25  0005 04/11/25  0500   ALT 74* 66*   AST 41* 33   ALB 4.0 4.2         Imaging:  CARD NUC STRESS TEST LEXISCAN (CPT 51305/70835/)  Result Date: 4/10/2025  CONCLUSION:  Normal regadenoson (Lexiscan) perfusion imaging study with normal ejection fraction and wall motion. There is no evidence of reversible stress-induced ischemia.    Dictated by (CST): Jeffrey Vaughn MD on 4/10/2025 at 12:46 PM     Finalized by (CST): Jeffrey Vaughn MD on 4/10/2025 at 12:47 PM          CT BRAIN OR HEAD (CPT=70450)  Result Date: 4/10/2025  CONCLUSION:  1.  No acute intracranial process. 2.  There are mild microvascular white matter ischemic changes, likely related to long-standing hypertension and/or diabetes. 3.  Atherosclerosis in the anterior and posterior circulations.  [facility] provided a preliminary report for this examination. This final report agrees with their preliminary findings.   Dictated by (CST): Cal Zheng MD on  4/10/2025 at 8:16 AM     Finalized by (CST): Cal Zheng MD on 4/10/2025 at 8:18 AM          CT CHEST PE AORTA (IV ONLY) (CPT=71260)  Result Date: 4/10/2025  CONCLUSION:  1.  No acute pulmonary embolus to the subsegmental pulmonary artery level.  No acute aortic injury; no dissection.  Chronic nonocclusive pulmonary embolus/vascular web within the peripheral aspect the right pulmonary artery. 2.  There is evidence for prior granulomatous disease exposure within the lungs and mediastinum/jose. 3.  Coronary atherosclerosis. 4.  Small hiatal hernia. 5.  Nonobstructing calculi within the visualized right kidney.  Right renal cyst. 6.  Nonspecific 17 x 18 mm subcarinal node, similar to prior exam from 2019, favoring benign/reactive etiology. 7.  Cholelithiasis. 8.  Central peripheral airway inflammation.  Interstitial changes compatible with fibrosis. 9.  4 mm left upper lobe pulmonary nodule, new since 2019.  Recommend follow-up CT chest in 12 months.  This could represent a noncalcified granuloma, other parenchymal nodule, or airway secretions.  Additional smaller nodular foci are also scattered throughout the lungs.  [facility] provided a preliminary report for this examination. This final report agrees with their preliminary findings.   Dictated by (CST): Cal Zheng MD on 4/10/2025 at 8:07 AM     Finalized by (CST): Cal Zheng MD on 4/10/2025 at 8:14 AM              Meds:     INPATIENT MEDICATIONS    Scheduled Medications:      Scheduled Meds[1]        Drips:  IV Meds[2]    PRN Medications  PRN Meds[3]                            [1] amLODIPine, 5 mg, Daily  atorvastatin, 20 mg, Daily  dorzolamide-timolol, 1 drop, TID  ezetimibe, 10 mg, Daily  latanoprost, 1 drop, Nightly  lisinopril, 10 mg, Daily  predniSONE, 2.5 mg, Daily with breakfast  enoxaparin, 40 mg, Daily  [2]    [3] acetaminophen, 500 mg, Q4H PRN  melatonin, 3 mg, Nightly PRN  polyethylene glycol (PEG 3350), 17 g, Daily PRN  sennosides, 17.2 mg,  Nightly PRN  bisacodyl, 10 mg, Daily PRN  fleet enema, 1 enema, Once PRN  ondansetron, 4 mg, Q6H PRN  metoclopramide, 5 mg, Q8H PRN

## 2025-04-11 NOTE — PROGRESS NOTES
[facility] CARDIOLOGY Progress Note      Lang Kramer is a 79 year old male who I assessed as follows:  Chief Complaint   Patient presents with    Dizziness          REASON FOR CONSULTATION:    syncope            ASSESSMENT/PLAN:     IMPRESSIONS:  Syncope, sounds vagal  Chest pain  History pulmonary embolus  HTN  HL, on statin/zetia        PLAN:  Echo  Tele  Pharm nuclear stress normal  BP meds reviewed  If echo ok, no further cardiac workup at this time.    Speaks Romansh. Discussed with daughter via phone.       SUBJECTIVE:    No chest pain, dyspnea, lightheadedness        --------------------------------------------------------------------------------------------------------------------------------    HPI:       History HTN, HL presents with syncope. Was in bathroom. Had emesis. Then felt clammy, lightheadedness. Felt chest tightness (mid chest). Then syncope and fell to floor. Afterward felt diaphoretic.     Similar symptoms prior to PE in past.     No prior syncope.     Speaks Romansh.         Current Medications[1]   Past Medical History[2]  Past Surgical History[3]  Family History[4]   Social History:  Short Social Hx on File[5]       Medications:  Current Hospital Medications[6]        Allergies  Allergies[7]            REVIEW OF SYSTEMS:   GENERAL HEALTH: no fevers  SKIN: denies any unusual skin lesions or rashes   EARS: no recent changes in hearing  EYE: no recent vision changes  THROAT: denies sore throat  RESPIRATORY: denies cough or shortness of breath with exertion  CARDIOVASCULAR: chest pain, syncope  GI: vomiting  NEURO: denies headaches and focal neurologic symptoms  PSYCH: no recent depression  ENDOCRINE: no change in sleep pattern  HEME: no easy bruising  All other systems reviewed and negative.          EXAM:         TELE: SR          /77 (BP Location: Right arm)   Pulse 54   Temp 98.5 °F (36.9 °C) (Oral)   Resp 16   Ht 5' 6\" (1.676 m)   Wt 170 lb 10.2 oz (77.4 kg)    SpO2 95%   BMI 27.54 kg/m²   Temp (24hrs), Av.3 °F (36.8 °C), Min:97.5 °F (36.4 °C), Max:98.5 °F (36.9 °C)    Wt Readings from Last 3 Encounters:   04/10/25 170 lb 10.2 oz (77.4 kg)   25 177 lb 8 oz (80.5 kg)   10/28/24 176 lb (79.8 kg)         Intake/Output Summary (Last 24 hours) at 2025 0837  Last data filed at 4/10/2025 2100  Gross per 24 hour   Intake 130 ml   Output 150 ml   Net -20 ml         GENERAL: well developed, well nourished, in no apparent distress  SKIN: no rashes  HEENT: atraumatic, normocephalic, throat without erythema  NECK: supple, no bruits  LUNGS: clear to auscultation  CARDIO: RRR without murmur or S3   GI: soft, nontender  EXTREMITIES: no cyanosis, clubbing or edema  NEUROLOGY: alert  PSYCH: cooperative          LABORATORY DATA:               ECG:        ECHO:          Labs:  Recent Labs   Lab 04/10/25  0005 25  0500   * 90   BUN 21 29*   CREATSERUM 1.51* 1.49*   CA 9.3 9.5    143   K 4.1 3.9    107   CO2 22.0 28.0     No results for input(s): \"TROP\" in the last 168 hours.  Recent Labs   Lab 04/10/25  0005 25  0500   RBC 5.64 4.86   HGB 17.0 15.0   HCT 50.8 44.6   MCV 90.1 91.8   MCH 30.1 30.9   MCHC 33.5 33.6   RDW 13.6 13.7   NEPRELIM 6.46  --    WBC 10.2 8.6   .0* 144.0*     Recent Labs   Lab 04/10/25  0005 04/10/25  0412 04/10/25  0631   TROPHS 9 8 10       Imaging:  CARD NUC STRESS TEST LEXISCAN (CPT 32824/32067/)  Result Date: 4/10/2025  CONCLUSION:  Normal regadenoson (Lexiscan) perfusion imaging study with normal ejection fraction and wall motion. There is no evidence of reversible stress-induced ischemia.    Dictated by (CST): Jeffrey Vaughn MD on 4/10/2025 at 12:46 PM     Finalized by (CST): Jeffrey Vaughn MD on 4/10/2025 at 12:47 PM          CT BRAIN OR HEAD (CPT=70450)  Result Date: 4/10/2025  CONCLUSION:  1.  No acute intracranial process. 2.  There are mild microvascular white matter ischemic changes, likely related  to long-standing hypertension and/or diabetes. 3.  Atherosclerosis in the anterior and posterior circulations.  [facility] provided a preliminary report for this examination. This final report agrees with their preliminary findings.   Dictated by (CST): Cal Zheng MD on 4/10/2025 at 8:16 AM     Finalized by (CST): Cal Zheng MD on 4/10/2025 at 8:18 AM          CT CHEST PE AORTA (IV ONLY) (CPT=71260)  Result Date: 4/10/2025  CONCLUSION:  1.  No acute pulmonary embolus to the subsegmental pulmonary artery level.  No acute aortic injury; no dissection.  Chronic nonocclusive pulmonary embolus/vascular web within the peripheral aspect the right pulmonary artery. 2.  There is evidence for prior granulomatous disease exposure within the lungs and mediastinum/jose. 3.  Coronary atherosclerosis. 4.  Small hiatal hernia. 5.  Nonobstructing calculi within the visualized right kidney.  Right renal cyst. 6.  Nonspecific 17 x 18 mm subcarinal node, similar to prior exam from 2019, favoring benign/reactive etiology. 7.  Cholelithiasis. 8.  Central peripheral airway inflammation.  Interstitial changes compatible with fibrosis. 9.  4 mm left upper lobe pulmonary nodule, new since 2019.  Recommend follow-up CT chest in 12 months.  This could represent a noncalcified granuloma, other parenchymal nodule, or airway secretions.  Additional smaller nodular foci are also scattered throughout the lungs.  [facility] provided a preliminary report for this examination. This final report agrees with their preliminary findings.   Dictated by (CST): Cal Zheng MD on 4/10/2025 at 8:07 AM     Finalized by (CST): Cal Zheng MD on 4/10/2025 at 8:14 AM                 Hong Vazquez MD          [1]   No current outpatient medications on file.   [2]   Past Medical History:   Deep vein thrombosis (HCC)    Essential hypertension    Glaucoma    High blood pressure    High cholesterol    HYPERTENSION    Pulmonary embolism (HCC)     SCC (squamous cell carcinoma)    Right ear    SCC (squamous cell carcinoma)    Right frontal scalp   [3]   Past Surgical History:  Procedure Laterality Date    Colonoscopy N/A 7/15/2019    Procedure: COLONOSCOPY;  Surgeon: Yaya Alexander MD;  Location: [facility] ENDOSCOPY   [4] History reviewed. No pertinent family history.  [5]   Social History  Socioeconomic History    Marital status:    Tobacco Use    Smoking status: Never     Passive exposure: Never    Smokeless tobacco: Never   Vaping Use    Vaping status: Never Used   Substance and Sexual Activity    Alcohol use: No    Drug use: No   Other Topics Concern    Grew up on a farm No    History of tanning Yes    Outdoor occupation No    Reaction to local anesthetic No    Pt has a pacemaker No    Pt has a defibrillator No     Social Drivers of Health     Food Insecurity: No Food Insecurity (4/10/2025)    NCSS - Food Insecurity     Worried About Running Out of Food in the Last Year: No     Ran Out of Food in the Last Year: No   Transportation Needs: No Transportation Needs (4/10/2025)    NCSS - Transportation     Lack of Transportation: No   Housing Stability: Not At Risk (4/10/2025)    NCSS - Housing/Utilities     Has Housing: Yes     Worried About Losing Housing: No     Unable to Get Utilities: No   [6]   Current Facility-Administered Medications   Medication Dose Route Frequency    magnesium oxide (Mag-Ox) tab 400 mg  400 mg Oral Once    amLODIPine (Norvasc) tab 5 mg  5 mg Oral Daily    atorvastatin (Lipitor) tab 20 mg  20 mg Oral Daily    dorzolamide-timolol (Cosopt) 2-0.5 % ophthalmic solution 1 drop  1 drop Both Eyes TID    ezetimibe (Zetia) tab 10 mg  10 mg Oral Daily    latanoprost (Xalatan) 0.005 % ophthalmic solution 1 drop  1 drop Both Eyes Nightly    lisinopril (Zestril) tab 10 mg  10 mg Oral Daily    predniSONE (Deltasone) tab 2.5 mg  2.5 mg Oral Daily with breakfast    enoxaparin (Lovenox) 40 MG/0.4ML SUBQ injection 40 mg  40 mg Subcutaneous Daily     acetaminophen (Tylenol Extra Strength) tab 500 mg  500 mg Oral Q4H PRN    melatonin tab 3 mg  3 mg Oral Nightly PRN    polyethylene glycol (PEG 3350) (Miralax) 17 g oral packet 17 g  17 g Oral Daily PRN    sennosides (Senokot) tab 17.2 mg  17.2 mg Oral Nightly PRN    bisacodyl (Dulcolax) 10 MG rectal suppository 10 mg  10 mg Rectal Daily PRN    fleet enema (Fleet) rectal enema 133 mL  1 enema Rectal Once PRN    ondansetron (Zofran) 4 MG/2ML injection 4 mg  4 mg Intravenous Q6H PRN    metoclopramide (Reglan) 5 mg/mL injection 5 mg  5 mg Intravenous Q8H PRN   [7] No Known Allergies

## 2025-04-11 NOTE — PLAN OF CARE
Problem: Patient Centered Care  Goal: Patient preferences are identified and integrated in the patient's plan of care  Description: Interventions:- What would you like us to know as we care for you?I live at home with family  - Provide timely, complete, and accurate information to patient/family- Incorporate patient and family knowledge, values, beliefs, and cultural backgrounds into the planning and delivery of care- Encourage patient/family to participate in care and decision-making at the level they choose- Honor patient and family perspectives and choices  Outcome: Adequate for Discharge     Problem: Patient/Family Goals  Goal: Patient/Family Long Term Goal  Description: Patient's Long Term Goal: Interventions:- - See additional Care Plan goals for specific interventions  Outcome: Adequate for Discharge  Goal: Patient/Family Short Term Goal  Description: Patient's Short Term Goal: Interventions: - - See additional Care Plan goals for specific interventions  Outcome: Adequate for Discharge     Problem: PAIN - ADULT  Goal: Verbalizes/displays adequate comfort level or patient's stated pain goal  Description: INTERVENTIONS:- Encourage pt to monitor pain and request assistance- Assess pain using appropriate pain scale- Administer analgesics based on type and severity of pain and evaluate response- Implement non-pharmacological measures as appropriate and evaluate response- Consider cultural and social influences on pain and pain management- Manage/alleviate anxiety- Utilize distraction and/or relaxation techniques- Monitor for opioid side effects- Notify MD/LIP if interventions unsuccessful or patient reports new pain- Anticipate increased pain with activity and pre-medicate as appropriate  Outcome: Adequate for Discharge     Problem: SAFETY ADULT - FALL  Goal: Free from fall injury  Description: INTERVENTIONS:- Assess pt frequently for physical needs- Identify cognitive and physical deficits and behaviors that  affect risk of falls.- [place] fall precautions as indicated by assessment.- Educate pt/family on patient safety including physical limitations- Instruct pt to call for assistance with activity based on assessment- Modify environment to reduce risk of injury- Provide assistive devices as appropriate- Consider OT/PT consult to assist with strengthening/mobility- Encourage toileting schedule  Outcome: Adequate for Discharge     Problem: DISCHARGE PLANNING  Goal: Discharge to home or other facility with appropriate resources  Description: INTERVENTIONS:- Identify barriers to discharge w/pt and caregiver- Include patient/family/discharge partner in discharge planning- Arrange for needed discharge resources and transportation as appropriate- Identify discharge learning needs (meds, wound care, etc)- Arrange for interpreters to assist at discharge as needed- Consider post-discharge preferences of patient/family/discharge partner- Complete POLST form as appropriate- Assess patient's ability to be responsible for managing their own health- Refer to Case Management Department for coordinating discharge planning if the patient needs post-hospital services based on physician/LIP order or complex needs related to functional status, cognitive ability or social support system  Outcome: Adequate for Discharge     Problem: Altered Communication/Language Barrier  Goal: Patient/Family is able to understand and participate in their care  Description: Interventions:- Assess communication ability and preferred communication style- Implement communication aides and strategies- Use visual cues when possible- Listen attentively, be patient, do not interrupt- Minimize distractions- Allow time for understanding and response- Establish method for patient to ask for assistance (call light)- Provide an  as needed- Communicate barriers and strategies to overcome with those who interact with patient  Outcome: Adequate for Discharge

## 2025-04-11 NOTE — PAYOR COMM NOTE
THIS IS AN OBSERVATION PATIENT      ADMISSION REVIEW     Payor: [facility]  Subscriber #:  [id number]  Authorization Number: [id number]    Admit date: 4/10/25  Admit time:  3:13 AM       REVIEW DOCUMENTATION:     ED Provider Notes              Patient Seen in: [facility] Emergency Department      History     Chief Complaint   Patient presents with    Dizziness     Stated Complaint: Near-Syncope    Subjective:   HPI  79-year-old male with polymyalgia rheumatica on prednisone, hypertension, high cholesterol, history of PE no longer on anticoagulants who presents for evaluation of dizziness and syncope.  His son provides a history that while he was in the bathroom he was feeling chest pain, short of breath, and became sweaty.  He did fall and lose consciousness.  EMS arrived and reported that his blood pressure was less than 90mmHg systolic.  No recent changes in his medication.  He is on lisinopril, low-dose prednisone, baby aspirin, and a statin.  No fever or chills, nausea or vomiting, no leg swelling, no recent travel or hospitalization.  His son reports that when this happened once in the past his diagnosis was PE.        ED Triage Vitals [04/09/25 2322]   /65   Pulse (!) 48   Resp 18   Temp 97 °F (36.1 °C)   Temp src Temporal   SpO2 96 %   O2 Device None (Room air)       Current Vitals:   Vital Signs  BP: 148/78  Pulse: 57  Resp: 16  Temp: 97 °F (36.1 °C)  Temp src: Temporal  MAP (mmHg): 99    Oxygen Therapy  SpO2: 98 %  O2 Device: None (Room air)        Physical Exam  Vitals and nursing note reviewed.   Constitutional:       Appearance: He is well-developed.   HENT:      Head: Normocephalic and atraumatic.      Mouth/Throat:      Mouth: Mucous membranes are moist.      Pharynx: Oropharynx is clear.   Eyes:      General: No visual field deficit.     Extraocular Movements: Extraocular movements intact.      Pupils: Pupils are equal, round, and reactive to light.    Cardiovascular:      Rate and Rhythm: Regular rhythm. Bradycardia present.      Heart sounds: Normal heart sounds.   Pulmonary:      Effort: Pulmonary effort is normal.      Breath sounds: Normal breath sounds.   Abdominal:      General: There is no distension.      Palpations: Abdomen is soft.      Tenderness: There is no abdominal tenderness.   Musculoskeletal:         General: Normal range of motion.      Cervical back: Normal range of motion and neck supple. No tenderness.   Skin:     General: Skin is warm.      Capillary Refill: Capillary refill takes less than 2 seconds.   Neurological:      Mental Status: He is alert.      GCS: GCS eye subscore is 4. GCS verbal subscore is 5. GCS motor subscore is 6.      Cranial Nerves: No cranial nerve deficit, dysarthria or facial asymmetry.      Comments: No focal deficits       Differential diagnosis includes but is not limited to heart block, arrhythmia, PE, electrolyte derangement, ACS/MI, adrenal insufficiency    ED Course     Labs Reviewed   CBC WITH DIFFERENTIAL WITH PLATELET - Abnormal; Notable for the following components:       Result Value    .0 (*)     Monocyte Absolute 1.28 (*)     All other components within normal limits   BASIC METABOLIC PANEL (8) - Abnormal; Notable for the following components:    Glucose 134 (*)     Creatinine 1.51 (*)     eGFR-Cr 46 (*)     All other components within normal limits   HEPATIC FUNCTION PANEL (7) - Abnormal; Notable for the following components:    AST 41 (*)     ALT 74 (*)     All other components within normal limits   TROPONIN I HIGH SENSITIVITY - Normal   PROTHROMBIN TIME (PT) - Normal   PTT, ACTIVATED - Normal   PRO BETA NATRIURETIC PEPTIDE - Normal   SCAN SLIDE   RAINBOW DRAW LAVENDER   RAINBOW DRAW LIGHT GREEN   RAINBOW DRAW BLUE     EKG    Rate, intervals and axes as noted on EKG Report.  Rate: 48  Rhythm: Sinus Rhythm  Reading: No STEMI, there are T wave inversions in V4 through V6 which are new compared  to prior EKG, QTc is 471 ms                 Medical Decision Making  On arrival to the ED, patient noted to be bradycardic though normotensive.  He is warm and well-perfused and mentating normally.  CBC shows thrombocytopenia.  BMP with creatinine of 1.51, there is mild elevation in LFTs.  Troponin and BNP within normal limits.  Per my independent interpretation of CT chest, no acute PE.  Patient will be admitted to the hospital for further management.  Discussed with Dr. Oviedo for admission and Dr Damon for consultation.    Problems Addressed:  Sinus bradycardia: complicated acute illness or injury with systemic symptoms that poses a threat to life or bodily functions  Syncope and collapse: complicated acute illness or injury with systemic symptoms that poses a threat to life or bodily functions    Amount and/or Complexity of Data Reviewed  Labs: ordered. Decision-making details documented in ED Course.  Radiology: ordered and independent interpretation performed. Decision-making details documented in ED Course.  ECG/medicine tests: ordered and independent interpretation performed. Decision-making details documented in ED Course.  Discussion of management or test interpretation with external provider(s): As above    Risk  Decision regarding hospitalization.        Disposition and Plan     Clinical Impression:  1. Syncope and collapse    2. Sinus bradycardia        H AN P      Syncope  - possible orthostatic hypotension vs. Arrythmia  - +orthostatics, will give IVF and repeat orthostatics  - CT PE negative for acute PE in ER  - monitor on tele  - cardiology consulted, recommended stress test and TTE     HTN  - BP stable  - home lisinopril    CARDS      IMPRESSIONS:  Syncope, sounds vagal  Chest pain  History pulmonary embolus  HTN  HL, on statin/zetia           PLAN:  Echo  Tele  Pharm nuclear stress normal  BP meds reviewed  If echo ok, no further cardiac workup at this time.    THIS IS AN OBSERVATION PATIENT            MEDICATIONS ADMINISTERED IN LAST 1 DAY:  dorzolamide-timolol (Cosopt) 2-0.5 % ophthalmic solution 1 drop       Date Action Dose Route User    4/10/2025 2002 Given 1 drop Both Eyes Lul Bustillo RN    4/10/2025 1706 Given 1 drop Both Eyes Whitney Wilkerson RN          latanoprost (Xalatan) 0.005 % ophthalmic solution 1 drop       Date Action Dose Route User    4/10/2025 2003 Given 1 drop Both Eyes Lul Bustillo RN          magnesium oxide (Mag-Ox) tab 400 mg       Date Action Dose Route User    4/11/2025 0900 Given 400 mg Oral Guillermina Padilla RN          Perflutren Lipid Microsphere (DEFINITY) 6.52 MG/ML injection 1.5 mL       Date Action Dose Route User    4/10/2025 1534 Given 1.5 mL Intravenous [address]          sodium chloride 0.9 % IV bolus 250 mL       Date Action Dose Route User    4/11/2025 1128 New Bag 250 mL Intravenous Guillermina Padilla, ASIF            Vitals (last day)       Date/Time Temp Pulse Resp BP SpO2 Weight O2 Device O2 Flow Rate (L/min) [facility]    04/11/25 1042 -- 65 -- 89/64 94 % -- None (Room air) -- TT    04/11/25 1041 -- 55 -- 114/74 94 % -- None (Room air) -- TT    04/11/25 1040 -- 50 -- 123/68 96 % -- None (Room air) -- TT    04/11/25 0854 98.5 °F (36.9 °C) 58 18 146/72 95 % -- None (Room air) -- TT    04/11/25 0438 98.5 °F (36.9 °C) 54 16 127/77 95 % -- None (Room air) -- DIMITRI    04/11/25 0300 98.5 °F (36.9 °C) -- -- -- -- -- -- -- DIMITRI    04/10/25 1957 97.5 °F (36.4 °C) 60 18 133/68 96 % -- None (Room air) -- DIMITRI    04/10/25 1700 98.5 °F (36.9 °C) -- 18 116/69 -- -- -- -- ED    04/10/25 1341 -- 67 -- 103/69 -- -- -- -- BG    04/10/25 1335 -- 67 -- 132/72 -- -- -- -- BG    04/10/25 1330 -- 66 -- 134/76 -- -- -- -- BG    04/10/25 0326 -- -- -- -- -- 170 lb 10.2 oz (77.4 kg) -- -- PK    04/10/25 0317 97.5 °F (36.4 °C) 52 18 130/78 95 % -- None (Room air) -- PK    04/10/25 0200 -- 57 19 134/78 95 % -- None (Room air) -- KW    04/10/25 0100 -- 57 16 148/78 98 % -- None (Room air) -- KW     04/10/25 0030 -- 49 22 119/70 95 % -- None (Room air) -- KW          CIWA Scores (since admission)       None

## 2025-04-11 NOTE — PLAN OF CARE
Problem: Patient Centered Care  Goal: Patient preferences are identified and integrated in the patient's plan of care  Description: Interventions:- What would you like us to know as we care for you?I live at home with family  - Provide timely, complete, and accurate information to patient/family- Incorporate patient and family knowledge, values, beliefs, and cultural backgrounds into the planning and delivery of care- Encourage patient/family to participate in care and decision-making at the level they choose- Honor patient and family perspectives and choices  Outcome: Progressing     Problem: Patient/Family Goals  Goal: Patient/Family Long Term Goal  Description: Patient's Long Term Goal: Interventions:- - See additional Care Plan goals for specific interventions  Outcome: Progressing  Goal: Patient/Family Short Term Goal  Description: Patient's Short Term Goal: Interventions: - - See additional Care Plan goals for specific interventions  Outcome: Progressing     Problem: PAIN - ADULT  Goal: Verbalizes/displays adequate comfort level or patient's stated pain goal  Description: INTERVENTIONS:- Encourage pt to monitor pain and request assistance- Assess pain using appropriate pain scale- Administer analgesics based on type and severity of pain and evaluate response- Implement non-pharmacological measures as appropriate and evaluate response- Consider cultural and social influences on pain and pain management- Manage/alleviate anxiety- Utilize distraction and/or relaxation techniques- Monitor for opioid side effects- Notify MD/LIP if interventions unsuccessful or patient reports new pain- Anticipate increased pain with activity and pre-medicate as appropriate  Outcome: Progressing     Problem: SAFETY ADULT - FALL  Goal: Free from fall injury  Description: INTERVENTIONS:- Assess pt frequently for physical needs- Identify cognitive and physical deficits and behaviors that affect risk of falls.- [place] fall  precautions as indicated by assessment.- Educate pt/family on patient safety including physical limitations- Instruct pt to call for assistance with activity based on assessment- Modify environment to reduce risk of injury- Provide assistive devices as appropriate- Consider OT/PT consult to assist with strengthening/mobility- Encourage toileting schedule  Outcome: Progressing     Problem: DISCHARGE PLANNING  Goal: Discharge to home or other facility with appropriate resources  Description: INTERVENTIONS:- Identify barriers to discharge w/pt and caregiver- Include patient/family/discharge partner in discharge planning- Arrange for needed discharge resources and transportation as appropriate- Identify discharge learning needs (meds, wound care, etc)- Arrange for interpreters to assist at discharge as needed- Consider post-discharge preferences of patient/family/discharge partner- Complete POLST form as appropriate- Assess patient's ability to be responsible for managing their own health- Refer to Case Management Department for coordinating discharge planning if the patient needs post-hospital services based on physician/LIP order or complex needs related to functional status, cognitive ability or social support system  Outcome: Progressing     Problem: Altered Communication/Language Barrier  Goal: Patient/Family is able to understand and participate in their care  Description: Interventions:- Assess communication ability and preferred communication style- Implement communication aides and strategies- Use visual cues when possible- Listen attentively, be patient, do not interrupt- Minimize distractions- Allow time for understanding and response- Establish method for patient to ask for assistance (call light)- Provide an  as needed- Communicate barriers and strategies to overcome with those who interact with patient  Outcome: Progressing

## 2025-04-11 NOTE — DISCHARGE INSTRUCTIONS
Wear compression socks during the day.    Stay hydrated.     See your primary care doctor in 1-2 weeks for hospital follow-up visit.     You will need a repeat CT chest in 1 year.

## 2025-04-11 NOTE — CM/SW NOTE
Patient failed inpatient criteria. Second level of review completed and supports observation.  UR committee in agreement. Discussed with Dr. Wright  who approves observation status.  Observation status and MOON  notice explained  to the patient  and Grand daughter . Copy placed in chart  Order for observation in place.    Jennifer BOURGEOIS, Utilization Review   Ext [phone]

## 2025-08-18 ENCOUNTER — TELEPHONE (OUTPATIENT)
Age: 80
End: 2025-08-18

## (undated) DEVICE — Device: Brand: CUSTOM PROCEDURE KIT

## (undated) DEVICE — CONMED SCOPE SAVER BITE BLOCK, 20X27 MM: Brand: SCOPE SAVER

## (undated) DEVICE — LINE MNTR ADLT SET O2 INTMD

## (undated) DEVICE — MEDI-VAC NON-CONDUCTIVE SUCTION TUBING 6MM X 1.8M (6FT.) L: Brand: CARDINAL HEALTH

## (undated) DEVICE — 35 ML SYRINGE REGULAR TIP: Brand: MONOJECT

## (undated) DEVICE — FORCEP RADIAL JAW 4

## (undated) DEVICE — SNARE OPTMZ PLPCTM TRP

## (undated) DEVICE — SNARE CAPTIFLEX MICRO-OVL OLY

## (undated) DEVICE — CLIP LGT 11MM OPEN 2.8MM 235CM

## (undated) DEVICE — Device: Brand: DEFENDO AIR/WATER/SUCTION AND BIOPSY VALVE

## (undated) NOTE — LETTER
11/5/2021              Lang Kramer        [address]         Dear Jordon Moeller,    My staff has been trying to reach you to set up an MRI scan to reevaluate the pancreas.   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

## (undated) NOTE — ED AVS SNAPSHOT
Todd Botello   MRN: [id number]    Department:  [facility] Emergency Department   Date of Visit:  7/2/2019           Disclosure     Insurance plans vary and the physician(s) referred by the ER may not be covered by your plan.  Please contact yo within the next three months to obtain basic health screening including reassessment of your blood pressure.     IF THERE IS ANY CHANGE OR WORSENING OF YOUR CONDITION, CALL YOUR PRIMARY CARE PHYSICIAN AT ONCE OR RETURN IMMEDIATELY TO THE EMERGENCY DEPARTMEN

## (undated) NOTE — LETTER
7/16/2019              Langrenato Romotemo        [address] [address]         Dear Param Connors,    I wanted to get back to you with your colonoscopy results. You had colon polyps removed which were benign.   I would advise a enrike

## (undated) NOTE — LETTER
9/22/2021              Lang Kramer        [address] [address]          Dear Ahsan Breen,    This letter is to inform you that you are due for a repeat MRI.   An order was already placed in our system for this exam.    To sched

## (undated) NOTE — LETTER
5/1/2024    Lang Kramer        [address]         [facility] [address]            Dear Lang Kramer,      Our records indicate that you are due for an appointment for a Colonoscopy with Yaya Alexander MD. Our doctors are booking out about 3-6 months in advance for procedures.     Please call our office to schedule a phone screening appointment to plan for the procedure(s).   Your medical well-being is important to us.    If your insurance requires a referral, please call your primary care office to request one.      Thank you,      The Physicians and Staff at [facility]